# Patient Record
Sex: FEMALE | Race: BLACK OR AFRICAN AMERICAN | NOT HISPANIC OR LATINO | URBAN - METROPOLITAN AREA
[De-identification: names, ages, dates, MRNs, and addresses within clinical notes are randomized per-mention and may not be internally consistent; named-entity substitution may affect disease eponyms.]

---

## 2018-09-26 ENCOUNTER — INPATIENT (INPATIENT)
Facility: HOSPITAL | Age: 58
LOS: 2 days | Discharge: HOME | DRG: 103 | End: 2018-09-29
Attending: INTERNAL MEDICINE | Admitting: INTERNAL MEDICINE
Payer: COMMERCIAL

## 2018-09-26 VITALS
WEIGHT: 184.09 LBS | TEMPERATURE: 99 F | OXYGEN SATURATION: 95 % | HEART RATE: 77 BPM | HEIGHT: 64 IN | RESPIRATION RATE: 16 BRPM | DIASTOLIC BLOOD PRESSURE: 62 MMHG | SYSTOLIC BLOOD PRESSURE: 96 MMHG

## 2018-09-26 DIAGNOSIS — N17.9 ACUTE KIDNEY FAILURE, UNSPECIFIED: ICD-10-CM

## 2018-09-26 DIAGNOSIS — Z29.9 ENCOUNTER FOR PROPHYLACTIC MEASURES, UNSPECIFIED: ICD-10-CM

## 2018-09-26 DIAGNOSIS — Z98.890 OTHER SPECIFIED POSTPROCEDURAL STATES: Chronic | ICD-10-CM

## 2018-09-26 DIAGNOSIS — R55 SYNCOPE AND COLLAPSE: ICD-10-CM

## 2018-09-26 DIAGNOSIS — E03.9 HYPOTHYROIDISM, UNSPECIFIED: ICD-10-CM

## 2018-09-26 DIAGNOSIS — G43.909 MIGRAINE, UNSPECIFIED, NOT INTRACTABLE, WITHOUT STATUS MIGRAINOSUS: ICD-10-CM

## 2018-09-26 DIAGNOSIS — Z90.49 ACQUIRED ABSENCE OF OTHER SPECIFIED PARTS OF DIGESTIVE TRACT: Chronic | ICD-10-CM

## 2018-09-26 DIAGNOSIS — J84.9 INTERSTITIAL PULMONARY DISEASE, UNSPECIFIED: ICD-10-CM

## 2018-09-26 DIAGNOSIS — I10 ESSENTIAL (PRIMARY) HYPERTENSION: ICD-10-CM

## 2018-09-26 DIAGNOSIS — E87.8 OTHER DISORDERS OF ELECTROLYTE AND FLUID BALANCE, NOT ELSEWHERE CLASSIFIED: ICD-10-CM

## 2018-09-26 LAB
ALBUMIN SERPL ELPH-MCNC: 3.6 G/DL — SIGNIFICANT CHANGE UP (ref 3.5–5)
ALP SERPL-CCNC: 81 U/L — SIGNIFICANT CHANGE UP (ref 40–120)
ALT FLD-CCNC: 24 U/L DA — SIGNIFICANT CHANGE UP (ref 10–60)
ANION GAP SERPL CALC-SCNC: 8 MMOL/L — SIGNIFICANT CHANGE UP (ref 5–17)
ANION GAP SERPL CALC-SCNC: 9 MMOL/L — SIGNIFICANT CHANGE UP (ref 5–17)
APPEARANCE UR: CLEAR — SIGNIFICANT CHANGE UP
APTT BLD: 28.1 SEC — SIGNIFICANT CHANGE UP (ref 27.5–37.4)
AST SERPL-CCNC: 22 U/L — SIGNIFICANT CHANGE UP (ref 10–40)
BASOPHILS # BLD AUTO: 0 K/UL — SIGNIFICANT CHANGE UP (ref 0–0.2)
BASOPHILS NFR BLD AUTO: 0.5 % — SIGNIFICANT CHANGE UP (ref 0–2)
BILIRUB SERPL-MCNC: 0.5 MG/DL — SIGNIFICANT CHANGE UP (ref 0.2–1.2)
BILIRUB UR-MCNC: NEGATIVE — SIGNIFICANT CHANGE UP
BUN SERPL-MCNC: 18 MG/DL — SIGNIFICANT CHANGE UP (ref 7–18)
BUN SERPL-MCNC: 21 MG/DL — HIGH (ref 7–18)
CALCIUM SERPL-MCNC: 10.5 MG/DL — SIGNIFICANT CHANGE UP (ref 8.4–10.5)
CALCIUM SERPL-MCNC: 9.7 MG/DL — SIGNIFICANT CHANGE UP (ref 8.4–10.5)
CHLORIDE SERPL-SCNC: 102 MMOL/L — SIGNIFICANT CHANGE UP (ref 96–108)
CHLORIDE SERPL-SCNC: 108 MMOL/L — SIGNIFICANT CHANGE UP (ref 96–108)
CO2 SERPL-SCNC: 23 MMOL/L — SIGNIFICANT CHANGE UP (ref 22–31)
CO2 SERPL-SCNC: 27 MMOL/L — SIGNIFICANT CHANGE UP (ref 22–31)
COLOR SPEC: YELLOW — SIGNIFICANT CHANGE UP
CREAT SERPL-MCNC: 1.05 MG/DL — SIGNIFICANT CHANGE UP (ref 0.5–1.3)
CREAT SERPL-MCNC: 1.43 MG/DL — HIGH (ref 0.5–1.3)
D DIMER BLD IA.RAPID-MCNC: 345 NG/ML DDU — HIGH
DIFF PNL FLD: NEGATIVE — SIGNIFICANT CHANGE UP
EOSINOPHIL # BLD AUTO: 0.1 K/UL — SIGNIFICANT CHANGE UP (ref 0–0.5)
EOSINOPHIL NFR BLD AUTO: 1.1 % — SIGNIFICANT CHANGE UP (ref 0–6)
GLUCOSE SERPL-MCNC: 123 MG/DL — HIGH (ref 70–99)
GLUCOSE SERPL-MCNC: 99 MG/DL — SIGNIFICANT CHANGE UP (ref 70–99)
GLUCOSE UR QL: NEGATIVE — SIGNIFICANT CHANGE UP
HCT VFR BLD CALC: 44.9 % — SIGNIFICANT CHANGE UP (ref 34.5–45)
HGB BLD-MCNC: 14.1 G/DL — SIGNIFICANT CHANGE UP (ref 11.5–15.5)
INR BLD: 1.04 RATIO — SIGNIFICANT CHANGE UP (ref 0.88–1.16)
KETONES UR-MCNC: NEGATIVE — SIGNIFICANT CHANGE UP
LEUKOCYTE ESTERASE UR-ACNC: NEGATIVE — SIGNIFICANT CHANGE UP
LYMPHOCYTES # BLD AUTO: 0.8 K/UL — LOW (ref 1–3.3)
LYMPHOCYTES # BLD AUTO: 17 % — SIGNIFICANT CHANGE UP (ref 13–44)
MAGNESIUM SERPL-MCNC: 2.3 MG/DL — SIGNIFICANT CHANGE UP (ref 1.6–2.6)
MAGNESIUM SERPL-MCNC: 2.4 MG/DL — SIGNIFICANT CHANGE UP (ref 1.6–2.6)
MCHC RBC-ENTMCNC: 26.8 PG — LOW (ref 27–34)
MCHC RBC-ENTMCNC: 31.4 GM/DL — LOW (ref 32–36)
MCV RBC AUTO: 85.5 FL — SIGNIFICANT CHANGE UP (ref 80–100)
MONOCYTES # BLD AUTO: 0.3 K/UL — SIGNIFICANT CHANGE UP (ref 0–0.9)
MONOCYTES NFR BLD AUTO: 5.4 % — SIGNIFICANT CHANGE UP (ref 2–14)
NEUTROPHILS # BLD AUTO: 3.7 K/UL — SIGNIFICANT CHANGE UP (ref 1.8–7.4)
NEUTROPHILS NFR BLD AUTO: 76.1 % — SIGNIFICANT CHANGE UP (ref 43–77)
NITRITE UR-MCNC: NEGATIVE — SIGNIFICANT CHANGE UP
PH UR: 7 — SIGNIFICANT CHANGE UP (ref 5–8)
PHOSPHATE SERPL-MCNC: 2 MG/DL — LOW (ref 2.5–4.5)
PHOSPHATE SERPL-MCNC: 2.2 MG/DL — LOW (ref 2.5–4.5)
PLATELET # BLD AUTO: 180 K/UL — SIGNIFICANT CHANGE UP (ref 150–400)
POTASSIUM SERPL-MCNC: 2.8 MMOL/L — CRITICAL LOW (ref 3.5–5.3)
POTASSIUM SERPL-MCNC: 4 MMOL/L — SIGNIFICANT CHANGE UP (ref 3.5–5.3)
POTASSIUM SERPL-SCNC: 2.8 MMOL/L — CRITICAL LOW (ref 3.5–5.3)
POTASSIUM SERPL-SCNC: 4 MMOL/L — SIGNIFICANT CHANGE UP (ref 3.5–5.3)
PROT SERPL-MCNC: 6.7 G/DL — SIGNIFICANT CHANGE UP (ref 6–8.3)
PROT UR-MCNC: NEGATIVE — SIGNIFICANT CHANGE UP
PROTHROM AB SERPL-ACNC: 11.3 SEC — SIGNIFICANT CHANGE UP (ref 9.8–12.7)
RBC # BLD: 5.25 M/UL — HIGH (ref 3.8–5.2)
RBC # FLD: 16.1 % — HIGH (ref 10.3–14.5)
SODIUM SERPL-SCNC: 138 MMOL/L — SIGNIFICANT CHANGE UP (ref 135–145)
SODIUM SERPL-SCNC: 139 MMOL/L — SIGNIFICANT CHANGE UP (ref 135–145)
SP GR SPEC: 1 — LOW (ref 1.01–1.02)
T4 AB SER-ACNC: 5.3 UG/DL — SIGNIFICANT CHANGE UP (ref 4.6–12)
T4 FREE SERPL-MCNC: 0.7 NG/DL — LOW (ref 0.9–1.8)
TROPONIN I SERPL-MCNC: <0.015 NG/ML — SIGNIFICANT CHANGE UP (ref 0–0.04)
TSH SERPL-MCNC: 89.5 UU/ML — HIGH (ref 0.34–4.82)
UROBILINOGEN FLD QL: 4
WBC # BLD: 4.8 K/UL — SIGNIFICANT CHANGE UP (ref 3.8–10.5)
WBC # FLD AUTO: 4.8 K/UL — SIGNIFICANT CHANGE UP (ref 3.8–10.5)

## 2018-09-26 PROCEDURE — 99223 1ST HOSP IP/OBS HIGH 75: CPT | Mod: AI,GC

## 2018-09-26 PROCEDURE — 70450 CT HEAD/BRAIN W/O DYE: CPT | Mod: 26

## 2018-09-26 PROCEDURE — 71046 X-RAY EXAM CHEST 2 VIEWS: CPT | Mod: 26

## 2018-09-26 PROCEDURE — 71275 CT ANGIOGRAPHY CHEST: CPT | Mod: 26

## 2018-09-26 PROCEDURE — 99285 EMERGENCY DEPT VISIT HI MDM: CPT

## 2018-09-26 RX ORDER — POTASSIUM PHOSPHATE, MONOBASIC POTASSIUM PHOSPHATE, DIBASIC 236; 224 MG/ML; MG/ML
15 INJECTION, SOLUTION INTRAVENOUS ONCE
Qty: 0 | Refills: 0 | Status: COMPLETED | OUTPATIENT
Start: 2018-09-26 | End: 2018-09-26

## 2018-09-26 RX ORDER — ATOVAQUONE 750 MG/5ML
750 SUSPENSION ORAL DAILY
Qty: 0 | Refills: 0 | Status: DISCONTINUED | OUTPATIENT
Start: 2018-09-26 | End: 2018-09-29

## 2018-09-26 RX ORDER — SUMATRIPTAN SUCCINATE 4 MG/.5ML
50 INJECTION, SOLUTION SUBCUTANEOUS DAILY
Qty: 0 | Refills: 0 | Status: DISCONTINUED | OUTPATIENT
Start: 2018-09-26 | End: 2018-09-27

## 2018-09-26 RX ORDER — ACETAMINOPHEN 500 MG
650 TABLET ORAL ONCE
Qty: 0 | Refills: 0 | Status: COMPLETED | OUTPATIENT
Start: 2018-09-26 | End: 2018-09-26

## 2018-09-26 RX ORDER — SODIUM CHLORIDE 9 MG/ML
1000 INJECTION INTRAMUSCULAR; INTRAVENOUS; SUBCUTANEOUS ONCE
Qty: 0 | Refills: 0 | Status: COMPLETED | OUTPATIENT
Start: 2018-09-26 | End: 2018-09-26

## 2018-09-26 RX ORDER — POTASSIUM CHLORIDE 20 MEQ
40 PACKET (EA) ORAL ONCE
Qty: 0 | Refills: 0 | Status: COMPLETED | OUTPATIENT
Start: 2018-09-26 | End: 2018-09-26

## 2018-09-26 RX ORDER — ASPIRIN/CALCIUM CARB/MAGNESIUM 324 MG
162 TABLET ORAL ONCE
Qty: 0 | Refills: 0 | Status: COMPLETED | OUTPATIENT
Start: 2018-09-26 | End: 2018-09-26

## 2018-09-26 RX ORDER — POTASSIUM CHLORIDE 20 MEQ
40 PACKET (EA) ORAL EVERY 4 HOURS
Qty: 0 | Refills: 0 | Status: DISCONTINUED | OUTPATIENT
Start: 2018-09-26 | End: 2018-09-26

## 2018-09-26 RX ORDER — POLYETHYLENE GLYCOL 3350 17 G/17G
17 POWDER, FOR SOLUTION ORAL DAILY
Qty: 0 | Refills: 0 | Status: DISCONTINUED | OUTPATIENT
Start: 2018-09-26 | End: 2018-09-29

## 2018-09-26 RX ORDER — LEVOTHYROXINE SODIUM 125 MCG
88 TABLET ORAL DAILY
Qty: 0 | Refills: 0 | Status: DISCONTINUED | OUTPATIENT
Start: 2018-09-26 | End: 2018-09-27

## 2018-09-26 RX ORDER — HEPARIN SODIUM 5000 [USP'U]/ML
5000 INJECTION INTRAVENOUS; SUBCUTANEOUS EVERY 8 HOURS
Qty: 0 | Refills: 0 | Status: DISCONTINUED | OUTPATIENT
Start: 2018-09-26 | End: 2018-09-29

## 2018-09-26 RX ORDER — POTASSIUM CHLORIDE 20 MEQ
10 PACKET (EA) ORAL
Qty: 0 | Refills: 0 | Status: COMPLETED | OUTPATIENT
Start: 2018-09-26 | End: 2018-09-26

## 2018-09-26 RX ORDER — LABETALOL HCL 100 MG
100 TABLET ORAL
Qty: 0 | Refills: 0 | Status: DISCONTINUED | OUTPATIENT
Start: 2018-09-26 | End: 2018-09-29

## 2018-09-26 RX ADMIN — SODIUM CHLORIDE 2000 MILLILITER(S): 9 INJECTION INTRAMUSCULAR; INTRAVENOUS; SUBCUTANEOUS at 10:56

## 2018-09-26 RX ADMIN — Medication 650 MILLIGRAM(S): at 22:38

## 2018-09-26 RX ADMIN — Medication 100 MILLIEQUIVALENT(S): at 16:41

## 2018-09-26 RX ADMIN — Medication 162 MILLIGRAM(S): at 10:56

## 2018-09-26 RX ADMIN — Medication 40 MILLIEQUIVALENT(S): at 13:21

## 2018-09-26 RX ADMIN — Medication 100 MILLIEQUIVALENT(S): at 15:29

## 2018-09-26 RX ADMIN — Medication 650 MILLIGRAM(S): at 22:08

## 2018-09-26 RX ADMIN — Medication 100 MILLIEQUIVALENT(S): at 13:21

## 2018-09-26 RX ADMIN — Medication 20 MILLIGRAM(S): at 22:33

## 2018-09-26 NOTE — ED PROVIDER NOTE - PMH
CKD (chronic kidney disease)    HTN (hypertension)    Hypothyroidism    Interstitial lung disease    Migraine    Moderate persistent asthma without complication

## 2018-09-26 NOTE — H&P ADULT - PROBLEM SELECTOR PLAN 1
Severe headache & Nausea leading to Syncope  Likely due to Chronic history of Migraines vs Hypotension  CT head: Normal  No need of MRI as there is no focal deficit   Recent Echo and Stress test normal in Feb 2018 Severe headache & Nausea leading to Syncope  Likely due to Chronic history of Migraines vs Hypotension  CT head: Normal  No need of MRI as there is no focal deficit   EKG: Normal sinus, Mild LBBB  Normal cardiac enzymes  Recent Echo and Stress test normal in Feb 2018 Severe headache & Nausea leading to Syncope  Likely due to Chronic history of Migraines vs Hypotension  CT head: Normal  Orthostatics Negative as patient got Bolus 1 Lit  No need of MRI as there is no focal deficit   EKG: Normal sinus, Mild LBBB  Normal cardiac enzymes  Recent Echo and Stress test normal in Feb 2018

## 2018-09-26 NOTE — H&P ADULT - HISTORY OF PRESENT ILLNESS
57 Female from home having PMH of HTN, Asthma, CKD, Migraines, Hypothyroidism, Autoimmune interstitial lung disease who comes to the hospital for syncope and shortness of breath. Pt woke up yesterday and went to kitchen when she started having severe headache similar to migraines associated with Nausea. She sat down on the chair and closed her eyes. When she woke up she was on the floor and wasn't aware for how long she stayed there. Pt denies loss of urine or bowel, shaky movements, focal weakness or slurry speech. She refused to come to hospital yesterday, today she was having again similar severe headache and associated shortness of breath which made her come to hospital.   Of Note, Pt recently had a lung biopsy in July 2018 at [facility] which came back as autoimmune interstitial lung disease and was started on steroids 3 weeks ago. She was also found to be hypothyroid recently. Pt had a Echo and stress test in Feb 2018 which was normal. Pt has been asked to see a Nephrologist for worsening renal function.     FH: Father had Gastric cancer, Mother had Ovarian cancer    ED Course: Pt was hypotensive to 96/62. She was given a bolus. Labs showed hypokalemia of 2.8 and TSH of 89. Elevated renal functions. CT angio showed left lung wedge resection without PE. 57 Female from home having PMH of HTN, Asthma, CKD, Migraines, Hypothyroidism, Autoimmune interstitial lung disease who comes to the hospital for syncope and shortness of breath. Pt woke up yesterday and went to kitchen when she started having severe headache similar to migraines associated with Nausea. She sat down on the chair and closed her eyes. When she woke up she was on the floor and wasn't aware for how long she stayed there. Pt denies loss of urine or bowel, shaky movements, focal weakness or slurry speech. She refused to come to hospital yesterday, today she was having again similar severe headache and associated shortness of breath which made her come to hospital.   Of Note, Pt recently had a lung biopsy in July 2018 at [facility] which came back as autoimmune interstitial lung disease and was started on steroids 3 weeks ago. She was also found to be hypothyroid recently. Pt had a Echo and stress test in Feb 2018 which was normal. Pt has been asked to see a Nephrologist for worsening renal function. Takes Lasix 40 off and on based on her LE edema at home.     FH: Father had Gastric cancer, Mother had Ovarian cancer    ED Course: Pt was hypotensive to 96/62. She was given a bolus. Labs showed hypokalemia of 2.8 and TSH of 89. Elevated renal functions. CT angio showed left lung wedge resection without PE. 57 Female from home having PMH of HTN, Asthma, CKD, Migraines, Hypothyroidism, Autoimmune interstitial lung disease who comes to the hospital for syncope and shortness of breath. Pt woke up yesterday and went to kitchen when she started having severe headache similar to migraines associated with Nausea. She sat down on the chair and closed her eyes. When she woke up she was on the floor and wasn't aware for how long she stayed there. Pt denies loss of urine or bowel, shaky movements, focal weakness or slurry speech. She refused to come to hospital yesterday, today she was having again similar severe headache and associated shortness of breath which made her come to hospital.   Of Note, Pt recently had a lung biopsy in July 2018 at [facility] which came back as autoimmune interstitial lung disease and was started on steroids 3 weeks ago. She was also found to be hypothyroid recently. Pt had a Echo and stress test in Feb 2018 which was normal. Pt has been asked to see a Nephrologist for worsening renal function. Takes Lasix 40 off and on based on her LE edema at home.     FH: Father had Gastric cancer, Mother had Ovarian cancer    ED Course: Pt was hypotensive to 96/62. She was given a bolus. Labs showed hypokalemia of 2.8 and TSH of 89. Elevated renal functions. CT angio showed left lung wedge resection without PE. EKG showed NSR with mild LBBB. 57 Female from home having PMH of HTN, Asthma, CKD, Migraines, Hypothyroidism, Autoimmune interstitial lung disease who comes to the hospital for syncope and shortness of breath. Pt woke up yesterday and went to kitchen when she started having severe headache similar to migraines associated with Nausea. She sat down on the chair and closed her eyes. When she woke up she was on the floor and wasn't aware for how long she stayed there. Pt denies loss of urine or bowel, shaky movements, focal weakness or slurry speech. She refused to come to hospital yesterday, today she was having again similar severe headache and associated shortness of breath which made her come to hospital.   Of Note, Pt recently had a lung biopsy in July 2018 at [facility] which came back as autoimmune interstitial lung disease and was started on steroids 3 weeks ago. She was also found to be hypothyroid recently. Pt had a Echo and stress test in Feb 2018 which was normal. Pt has been asked to see a Nephrologist for worsening renal function. Takes Lasix 40 off and on based on her LE edema at home.     FH: Father had Gastric cancer, Mother had Ovarian cancer    ED Course: Pt was hypotensive to 96/62. She was given a bolus. Labs showed hypokalemia of 2.8 and TSH of 89. Elevated renal functions. CT angio showed left lung wedge resection without PE. EKG showed NSR with mild LBBB. Orthostatics Negative.

## 2018-09-26 NOTE — H&P ADULT - PROBLEM SELECTOR PLAN 8
Improve VTE score: 1 (Heparin sq)  [ ] Previous VTE                                               3  [ ] Thrombophilia                                             2  [ ] Lower limb paralysis                                   2    [ ] Current Cancer                                           2   [x] Immobilization > 24 hrs                              1  [ ] ICU/CCU stay > 24 hours                            1  [ ] Age > 60                                                       1 C/w home meds  Fioricet, Imitrex, Trokendi XR as needed

## 2018-09-26 NOTE — H&P ADULT - PROBLEM SELECTOR PLAN 5
TSH 89  Has chronic constipation  C/w Synthroid 88 daily  F/u Free T3 Pt takes Telmisartan-hctz, Labetalol  Pt was hypotensive on admission  Restart meds as needed

## 2018-09-26 NOTE — H&P ADULT - ASSESSMENT
57 Female from home having PMH of HTN, Asthma, CKD, Migraines, Hypothyroidism, Autoimmune interstitial lung disease who comes to the hospital for syncope and shortness of breath. Pt woke up yesterday and went to kitchen when she started having severe headache similar to migraines associated with Nausea. She sat down on the chair and closed her eyes. When she woke up she was on the floor and wasn't aware for how long she stayed there. Pt denies loss of urine or bowel, shaky movements, focal weakness or slurry speech. She refused to come to hospital yesterday, today she was having again similar severe headache and associated shortness of breath which made her come to hospital.   Of Note, Pt recently had a lung biopsy in July 2018 at [facility] which came back as autoimmune interstitial lung disease and was started on steroids 3 weeks ago. She was also found to be hypothyroid recently. Pt had a Echo and stress test in Feb 2018 which was normal. Pt has been asked to see a Nephrologist for worsening renal function.     FH: Father had Gastric cancer, Mother had Ovarian cancer    ED Course: Pt was hypotensive to 96/62. She was given a bolus. Labs showed hypokalemia of 2.8 and TSH of 89. Elevated renal functions. CT angio showed left lung wedge resection without PE. 57 Female from home having PMH of HTN, Asthma, CKD, Migraines, Hypothyroidism, Autoimmune interstitial lung disease who comes to the hospital for syncope and shortness of breath. Pt had a Echo and stress test in Feb 2018 which was normal. 57 Female from home having PMH of HTN, Asthma, CKD, Migraines, Hypothyroidism, Autoimmune interstitial lung disease who comes to the hospital for syncope and shortness of breath. Pt had a Echo and stress test in Feb 2018 which was normal.    Orthostatic BP: Laying 102/62 (68) --> Standing 124/79 (78)

## 2018-09-26 NOTE — H&P ADULT - NSHPOUTPATIENTPROVIDERS_GEN_ALL_CORE
Dr Em Conte (Pul) [phone]  Dr Griffiths (Cardio) [phone]  Dr Jeane Feliz (Endo) [phone]   Dr Em Conte (Pul) [phone]  Dr Griffiths (Cardio) [phone]  Dr Jeane Feliz (Endo) [phone]  Dr Sam (Pul) [phone]

## 2018-09-26 NOTE — H&P ADULT - PROBLEM SELECTOR PLAN 9
Improve VTE score: 1 (Heparin sq)  [ ] Previous VTE                                               3  [ ] Thrombophilia                                             2  [ ] Lower limb paralysis                                   2    [ ] Current Cancer                                           2   [x] Immobilization > 24 hrs                              1  [ ] ICU/CCU stay > 24 hours                            1  [ ] Age > 60                                                       1

## 2018-09-26 NOTE — ED PROVIDER NOTE - OBJECTIVE STATEMENT
57F from home PMH HTN, Asthma, CKD, Migraines, Hypothyroidism, Autoimmune interstitial lung disease who comes to the hospital for syncope and palpitations.  Patient has been having a spat of health problems recently. Had a lung biopsy for an abnormality seen on cxr at [facility] which came back as autoimmune interstitial lung disease - still being worked up and was started on steroids. However, as a consequence of being on steroids, patient's thyroid function became abnormal despite synthroid medication not being changed. She was found to be hyperthyroid and was recommended to cut her synthroid in half but she's been forgetting occasionally and still taking full dose.   Patient also has additional social stressors because her daughter had her first child recently and she's been trying to help out.   Yesterday she took a full dose of synthroid by mistake and later in the day, she felt she had to get up from acid reflux. When she got up and out of bed, she felt dizzy and immediately sat back down. The next thing she knew, she had passed out and woke up on the floor. However she did not want to go to the hospital and stayed in bed the rest of the day. Today she's been having other symptoms - she complains of a feeling of palpitations and has some discomfort on deep breathing.

## 2018-09-26 NOTE — H&P ADULT - RS GEN PE MLT RESP DETAILS PC
breath sounds equal/good air movement/airway patent/no chest wall tenderness/normal/clear to auscultation bilaterally/respirations non-labored

## 2018-09-26 NOTE — H&P ADULT - PROBLEM SELECTOR PLAN 6
Unknown baseline renal functions  Cr 1.43  Holding Losartan, Hctz and Lasix Unknown baseline renal functions  Cr 1.43  Holding Telmesartan, Hctz and Lasix TSH 89  Has chronic constipation  C/w Synthroid 88 daily  F/u Free T3

## 2018-09-26 NOTE — H&P ADULT - ATTENDING COMMENTS
Patient was examined in the ED with Dr. Butts.   Syncope, likely secondary to hypovolemia.  Hypokalemia  Interstitial lung disease by history  Hypothyroidism.     Plan: IV fluids.  Replace potassium.  Records from Pulmonologist at [facility].           f/u TFT's           Rheumatology panel.

## 2018-09-26 NOTE — H&P ADULT - PROBLEM SELECTOR PLAN 3
Recently diagnosed in July 2018  Not wheezing  C/w Prednisone 20 Recently diagnosed in July 2018  Not wheezing  C/w Prednisone 20 bid  Pt is taking Atovaquone as ppx for PCP as she is on steroids Recently diagnosed in July 2018  Not wheezing  C/w Prednisone 20 bid  Pt is taking Atovaquone as ppx for PCP as she is on steroids  Dr Sam (Pul) [phone]: Get biopsy results in AM

## 2018-09-26 NOTE — ED ADULT NURSE NOTE - OBJECTIVE STATEMENT
Patient presents to Ed Patient presents to ED with dizziness , palpations and chest pain. Patient with multiple issues lung biospy took thyroid medication by mistake , daughter just had baby. Patient states at present she doesn't have pain but feels very weak.

## 2018-09-26 NOTE — ED ADULT NURSE NOTE - NSIMPLEMENTINTERV_GEN_ALL_ED
Implemented All Universal Safety Interventions:  [place] to call system. Call bell, personal items and telephone within reach. Instruct patient to call for assistance. Room bathroom lighting operational. Non-slip footwear when patient is off stretcher. Physically safe environment: no spills, clutter or unnecessary equipment. Stretcher in lowest position, wheels locked, appropriate side rails in place.

## 2018-09-26 NOTE — ED PROVIDER NOTE - FAMILY HISTORY
Father  Still living? Unknown  Family history of hypertension, Age at diagnosis: Age Unknown  Family history of coronary artery disease, Age at diagnosis: Age Unknown  Family history of cerebrovascular accident (CVA), Age at diagnosis: Age Unknown  Family history of stomach cancer, Age at diagnosis: Age Unknown     Mother  Still living? Unknown  Family history of hypertension, Age at diagnosis: Age Unknown  Family history of coronary artery disease, Age at diagnosis: Age Unknown  Family history of ovarian cancer, Age at diagnosis: Age Unknown

## 2018-09-26 NOTE — ED PROVIDER NOTE - CHPI ED SYMPTOMS NEG
no back pain/no chills/no nausea/no shortness of breath/no vomiting/no cough/no dizziness/no diaphoresis/no fever

## 2018-09-26 NOTE — H&P ADULT - FAMILY HISTORY
Mother  Still living? Unknown  Family history of hypertension, Age at diagnosis: Age Unknown  Family history of cerebrovascular accident (CVA), Age at diagnosis: Age Unknown  Family history of ovarian cancer, Age at diagnosis: Age Unknown     Father  Still living? Unknown  Family history of coronary artery disease, Age at diagnosis: Age Unknown  Family history of stomach cancer, Age at diagnosis: Age Unknown

## 2018-09-26 NOTE — H&P ADULT - PROBLEM SELECTOR PLAN 4
Pt takes Telmisartan-hctz, Labetalol  Pt was hypotensive on admission  Restart meds as needed Ocular dryness and diffuse joint pains  F/u Autoimmune panel  Could be underlying cause of ILD Ocular dryness and diffuse joint pains  F/u Autoimmune panel  Could be underlying cause of ILD  Call Rheumatologist in AM

## 2018-09-26 NOTE — H&P ADULT - PROBLEM SELECTOR PLAN 2
Severe Hypokalemia and Hypophosphatemia  Monitor electrolytes and replace aggressively Likely due to use of Diuretics at home  Severe Hypokalemia and Hypophosphatemia  Monitor electrolytes and replace aggressively\  F/u Urine electrolytes

## 2018-09-26 NOTE — ED PROVIDER NOTE - CONSTITUTIONAL, MLM
normal... faint appearing and weak, well nourished, awake, alert, oriented to person, place, time/situation

## 2018-09-26 NOTE — H&P ADULT - PROBLEM SELECTOR PLAN 7
C/w home meds  Fioricet, Imitrex, Trokendi XR C/w home meds  Fioricet, Imitrex, Trokendi XR as needed Unknown baseline renal functions  Cr 1.43  Holding Telmesartan, Hctz and Lasix

## 2018-09-26 NOTE — ED PROVIDER NOTE - ATTENDING CONTRIBUTION TO CARE
Dr. Sneed: I have personally performed a face to face bedside history and physical examination of this patient. I have discussed the history, examination, review of systems, assessment and plan of management with the resident. I have reviewed the electronic medical record and amended it to reflect my history, review of systems, physical exam, assessment and plan.    Attending Exam - Dr. Sneed: GEN: well appearing, NAD  HEENT: +PERRL, EOMI  RESP: CTAB, no signs of respiratory distress CV: s1s2 RRR ABD: soft/non tender/non distended  MSK: no deformities / swelling, normal range of motion, spine grossly normal NEURO: alert, non focal exam SKIN: normal color / temperature / condition.

## 2018-09-26 NOTE — ED ADULT NURSE NOTE - ED STAT RN HANDOFF DETAILS
received  pt.in bed at 1910  pt.is  awake and  ressponsive. c/o  head ache. tylenol  650 mg   given  as ordered.  on tele box G with  NSR,.transfer to rm 501 C report given to jina carbajal. pt.not  in distress

## 2018-09-26 NOTE — ED ADULT NURSE NOTE - CHPI ED NUR SYMPTOMS NEG
no shortness of breath/no nausea/no fever/no diaphoresis/no chills/no syncope/no vomiting/no back pain/no congestion

## 2018-09-27 LAB
ALBUMIN SERPL ELPH-MCNC: 3.5 G/DL — SIGNIFICANT CHANGE UP (ref 3.5–5)
ALP SERPL-CCNC: 83 U/L — SIGNIFICANT CHANGE UP (ref 40–120)
ALT FLD-CCNC: 22 U/L DA — SIGNIFICANT CHANGE UP (ref 10–60)
ANION GAP SERPL CALC-SCNC: 7 MMOL/L — SIGNIFICANT CHANGE UP (ref 5–17)
AST SERPL-CCNC: 15 U/L — SIGNIFICANT CHANGE UP (ref 10–40)
BASOPHILS # BLD AUTO: 0 K/UL — SIGNIFICANT CHANGE UP (ref 0–0.2)
BASOPHILS NFR BLD AUTO: 0.4 % — SIGNIFICANT CHANGE UP (ref 0–2)
BILIRUB SERPL-MCNC: 0.4 MG/DL — SIGNIFICANT CHANGE UP (ref 0.2–1.2)
BUN SERPL-MCNC: 16 MG/DL — SIGNIFICANT CHANGE UP (ref 7–18)
CALCIUM SERPL-MCNC: 10.1 MG/DL — SIGNIFICANT CHANGE UP (ref 8.4–10.5)
CHLORIDE SERPL-SCNC: 107 MMOL/L — SIGNIFICANT CHANGE UP (ref 96–108)
CK MB BLD-MCNC: 1.2 % — SIGNIFICANT CHANGE UP (ref 0–3.5)
CK MB CFR SERPL CALC: 1.1 NG/ML — SIGNIFICANT CHANGE UP (ref 0–3.6)
CK SERPL-CCNC: 94 U/L — SIGNIFICANT CHANGE UP (ref 21–215)
CO2 SERPL-SCNC: 24 MMOL/L — SIGNIFICANT CHANGE UP (ref 22–31)
CREAT SERPL-MCNC: 1 MG/DL — SIGNIFICANT CHANGE UP (ref 0.5–1.3)
CULTURE RESULTS: SIGNIFICANT CHANGE UP
EOSINOPHIL # BLD AUTO: 0 K/UL — SIGNIFICANT CHANGE UP (ref 0–0.5)
EOSINOPHIL NFR BLD AUTO: 0.3 % — SIGNIFICANT CHANGE UP (ref 0–6)
ERYTHROCYTE [SEDIMENTATION RATE] IN BLOOD: 2 MM/HR — SIGNIFICANT CHANGE UP (ref 0–20)
FERRITIN SERPL-MCNC: 180 NG/ML — HIGH (ref 15–150)
FOLATE SERPL-MCNC: 5.4 NG/ML — SIGNIFICANT CHANGE UP
GLUCOSE SERPL-MCNC: 104 MG/DL — HIGH (ref 70–99)
HBA1C BLD-MCNC: 4.4 % — SIGNIFICANT CHANGE UP (ref 4–5.6)
HCT VFR BLD CALC: 40.3 % — SIGNIFICANT CHANGE UP (ref 34.5–45)
HGB BLD-MCNC: 12.9 G/DL — SIGNIFICANT CHANGE UP (ref 11.5–15.5)
LYMPHOCYTES # BLD AUTO: 0.6 K/UL — LOW (ref 1–3.3)
LYMPHOCYTES # BLD AUTO: 13 % — SIGNIFICANT CHANGE UP (ref 13–44)
MAGNESIUM SERPL-MCNC: 2.3 MG/DL — SIGNIFICANT CHANGE UP (ref 1.6–2.6)
MCHC RBC-ENTMCNC: 27.4 PG — SIGNIFICANT CHANGE UP (ref 27–34)
MCHC RBC-ENTMCNC: 31.9 GM/DL — LOW (ref 32–36)
MCV RBC AUTO: 85.7 FL — SIGNIFICANT CHANGE UP (ref 80–100)
MONOCYTES # BLD AUTO: 0.2 K/UL — SIGNIFICANT CHANGE UP (ref 0–0.9)
MONOCYTES NFR BLD AUTO: 4.2 % — SIGNIFICANT CHANGE UP (ref 2–14)
NEUTROPHILS # BLD AUTO: 3.8 K/UL — SIGNIFICANT CHANGE UP (ref 1.8–7.4)
NEUTROPHILS NFR BLD AUTO: 82 % — HIGH (ref 43–77)
PHOSPHATE SERPL-MCNC: 2 MG/DL — LOW (ref 2.5–4.5)
PLATELET # BLD AUTO: 176 K/UL — SIGNIFICANT CHANGE UP (ref 150–400)
POTASSIUM SERPL-MCNC: 3.6 MMOL/L — SIGNIFICANT CHANGE UP (ref 3.5–5.3)
POTASSIUM SERPL-SCNC: 3.6 MMOL/L — SIGNIFICANT CHANGE UP (ref 3.5–5.3)
PROT SERPL-MCNC: 6.4 G/DL — SIGNIFICANT CHANGE UP (ref 6–8.3)
RBC # BLD: 4.7 M/UL — SIGNIFICANT CHANGE UP (ref 3.8–5.2)
RBC # FLD: 16.1 % — HIGH (ref 10.3–14.5)
RHEUMATOID FACT SERPL-ACNC: <10 IU/ML — SIGNIFICANT CHANGE UP (ref 0–13)
SODIUM SERPL-SCNC: 138 MMOL/L — SIGNIFICANT CHANGE UP (ref 135–145)
SPECIMEN SOURCE: SIGNIFICANT CHANGE UP
T3FREE SERPL-MCNC: 0.98 PG/ML — LOW (ref 1.8–4.6)
T4 FREE SERPL-MCNC: 0.7 NG/DL — LOW (ref 0.9–1.8)
TROPONIN I SERPL-MCNC: <0.015 NG/ML — SIGNIFICANT CHANGE UP (ref 0–0.04)
URATE SERPL-MCNC: 3.8 MG/DL — SIGNIFICANT CHANGE UP (ref 2.5–7)
VIT B12 SERPL-MCNC: 393 PG/ML — SIGNIFICANT CHANGE UP (ref 232–1245)
WBC # BLD: 4.6 K/UL — SIGNIFICANT CHANGE UP (ref 3.8–10.5)
WBC # FLD AUTO: 4.6 K/UL — SIGNIFICANT CHANGE UP (ref 3.8–10.5)

## 2018-09-27 PROCEDURE — 93306 TTE W/DOPPLER COMPLETE: CPT | Mod: 26

## 2018-09-27 PROCEDURE — 99233 SBSQ HOSP IP/OBS HIGH 50: CPT | Mod: GC

## 2018-09-27 RX ORDER — SODIUM,POTASSIUM PHOSPHATES 278-250MG
1 POWDER IN PACKET (EA) ORAL
Qty: 0 | Refills: 0 | Status: COMPLETED | OUTPATIENT
Start: 2018-09-27 | End: 2018-09-28

## 2018-09-27 RX ORDER — INFLUENZA VIRUS VACCINE 15; 15; 15; 15 UG/.5ML; UG/.5ML; UG/.5ML; UG/.5ML
0.5 SUSPENSION INTRAMUSCULAR ONCE
Qty: 0 | Refills: 0 | Status: COMPLETED | OUTPATIENT
Start: 2018-09-27 | End: 2018-09-27

## 2018-09-27 RX ORDER — LEVOTHYROXINE SODIUM 125 MCG
100 TABLET ORAL DAILY
Qty: 0 | Refills: 0 | Status: DISCONTINUED | OUTPATIENT
Start: 2018-09-27 | End: 2018-09-29

## 2018-09-27 RX ORDER — SUMATRIPTAN SUCCINATE 4 MG/.5ML
50 INJECTION, SOLUTION SUBCUTANEOUS
Qty: 0 | Refills: 0 | Status: DISCONTINUED | OUTPATIENT
Start: 2018-09-27 | End: 2018-09-27

## 2018-09-27 RX ORDER — LEVOTHYROXINE SODIUM 125 MCG
125 TABLET ORAL DAILY
Qty: 0 | Refills: 0 | Status: DISCONTINUED | OUTPATIENT
Start: 2018-09-27 | End: 2018-09-27

## 2018-09-27 RX ADMIN — SUMATRIPTAN SUCCINATE 50 MILLIGRAM(S): 4 INJECTION, SOLUTION SUBCUTANEOUS at 20:36

## 2018-09-27 RX ADMIN — Medication 1 CAPSULE(S): at 22:47

## 2018-09-27 RX ADMIN — Medication 88 MICROGRAM(S): at 06:55

## 2018-09-27 RX ADMIN — Medication 20 MILLIGRAM(S): at 06:55

## 2018-09-27 RX ADMIN — Medication 1 TABLET(S): at 22:48

## 2018-09-27 RX ADMIN — Medication 20 MILLIGRAM(S): at 18:23

## 2018-09-27 RX ADMIN — SUMATRIPTAN SUCCINATE 50 MILLIGRAM(S): 4 INJECTION, SOLUTION SUBCUTANEOUS at 12:31

## 2018-09-27 RX ADMIN — SUMATRIPTAN SUCCINATE 50 MILLIGRAM(S): 4 INJECTION, SOLUTION SUBCUTANEOUS at 21:30

## 2018-09-27 RX ADMIN — Medication 100 MILLIGRAM(S): at 18:23

## 2018-09-27 RX ADMIN — SUMATRIPTAN SUCCINATE 50 MILLIGRAM(S): 4 INJECTION, SOLUTION SUBCUTANEOUS at 11:31

## 2018-09-27 RX ADMIN — Medication 1 CAPSULE(S): at 15:48

## 2018-09-27 RX ADMIN — Medication 1 TABLET(S): at 11:43

## 2018-09-27 RX ADMIN — Medication 1 CAPSULE(S): at 14:48

## 2018-09-27 RX ADMIN — ATOVAQUONE 750 MILLIGRAM(S): 750 SUSPENSION ORAL at 11:43

## 2018-09-27 RX ADMIN — Medication 1 TABLET(S): at 18:23

## 2018-09-27 RX ADMIN — POLYETHYLENE GLYCOL 3350 17 GRAM(S): 17 POWDER, FOR SOLUTION ORAL at 11:43

## 2018-09-27 RX ADMIN — Medication 1 CAPSULE(S): at 23:30

## 2018-09-27 NOTE — PROGRESS NOTE ADULT - PROBLEM SELECTOR PLAN 2
Likely due to use of Diuretics at home  Severe Hypokalemia and Hypophosphatemia  Monitor electrolytes and replace aggressively\  F/u Urine electrolytes

## 2018-09-27 NOTE — PROGRESS NOTE ADULT - PROBLEM SELECTOR PLAN 3
Recently diagnosed in July 2018  Not wheezing  C/w Prednisone 20 bid  Pt is taking Atovaquone as ppx for PCP as she is on steroids  Dr Sam (Pul) [phone] contacted to get biopsy results.

## 2018-09-27 NOTE — PROGRESS NOTE ADULT - PROBLEM SELECTOR PLAN 4
Ocular dryness and diffuse joint pains  F/u Autoimmune panel  Could be underlying cause of ILD  Patient states pt has her first appointment set up with her rheumatologist at the end of November

## 2018-09-27 NOTE — CONSULT NOTE ADULT - ATTENDING COMMENTS
57 yo F with noted PMH, presenting with likely vagally-medicated syncopal episode, potentially in setting of nausea due to migraines.   -Agree with echocardiogram  -Discussed with patient re: vagal symptoms and ways to mitigate response  -OP follow-up with neurologist for improving migraine control; this should help with episodes  -If syncope persists, would send to EP for evaluation for loop recorder. Can see Dr. Hines at [phone]

## 2018-09-27 NOTE — PROGRESS NOTE ADULT - SUBJECTIVE AND OBJECTIVE BOX
PGY 1 Note discussed with supervising resident and primary attending    Patient is a 57y old  Female who presents with a chief complaint of Syncope and headache (26 Sep 2018 17:52)      INTERVAL HPI/OVERNIGHT EVENTS: No acute events overnight, remains afebrile; HD stable, H/H stable, WBC WNL    MEDICATIONS  (STANDING):  atovaquone Suspension 750 milliGRAM(s) Oral daily  heparin  Injectable 5000 Unit(s) SubCutaneous every 8 hours  labetalol 100 milliGRAM(s) Oral two times a day  levothyroxine 125 MICROGram(s) Oral daily  polyethylene glycol 3350 17 Gram(s) Oral daily  potassium acid phosphate/sodium acid phosphate tablet (K-PHOS No. 2) 1 Tablet(s) Oral four times a day with meals  predniSONE   Tablet 20 milliGRAM(s) Oral every 12 hours    MEDICATIONS  (PRN):  acetaminophen 300 mG/butalbital 50 mG/ caffeine 40 mG 1 Capsule(s) Oral every 8 hours PRN Moderate Pain (4 - 6)  SUMAtriptan 50 milliGRAM(s) Oral daily PRN Migraine      __________________________________________________  REVIEW OF SYSTEMS:    CONSTITUTIONAL: No fever,   EYES: no acute visual disturbances; occular dryness  NECK: No pain or stiffness  RESPIRATORY: No cough; Shortness of breath  CARDIOVASCULAR: No chest pain, no palpitations  GASTROINTESTINAL: No pain. No nausea or vomiting; No diarrhea   NEUROLOGICAL: Headache. No numbness or tremors  MUSCULOSKELETAL: Diffuse joint pains, no muscle pain  GENITOURINARY: no dysuria, no frequency, no hesitancy      Vital Signs Last 24 Hrs  T(C): 37.1 (27 Sep 2018 07:23), Max: 37.1 (27 Sep 2018 07:23)  T(F): 98.8 (27 Sep 2018 07:23), Max: 98.8 (27 Sep 2018 07:23)  HR: 77 (27 Sep 2018 07:23) (70 - 82)  BP: 114/76 (27 Sep 2018 07:23) (89/65 - 114/76)  BP(mean): --  RR: 18 (27 Sep 2018 07:23) (18 - 19)  SpO2: 97% (27 Sep 2018 07:23) (97% - 100%)    ________________________________________________  PHYSICAL EXAM:    GENERAL: NAD  HEENT: Normocephalic;  conjunctivae and sclerae clear; moist mucous membranes;   NECK : supple  CHEST/LUNG: Clear to auscultation bilaterally with good air entry   HEART: S1 S2  regular; no murmurs, gallops or rubs  ABDOMEN: Soft, Nontender, Nondistended; Bowel sounds present  EXTREMITIES: no cyanosis; no edema; no calf tenderness  SKIN: warm and dry; no rash  NERVOUS SYSTEM:  Awake and alert; no new deficits    _________________________________________________  LABS:                        12.9   4.6   )-----------( 176      ( 27 Sep 2018 07:37 )             40.3         138  |  107  |  16  ----------------------------<  104<H>  3.6   |  24  |  1.00    Ca    10.1      27 Sep 2018 07:37  Phos  2.0       Mg     2.3         TPro  6.4  /  Alb  3.5  /  TBili  0.4  /  DBili  x   /  AST  15  /  ALT  22  /  AlkPhos  83      PT/INR - ( 26 Sep 2018 10:53 )   PT: 11.3 sec;   INR: 1.04 ratio         PTT - ( 26 Sep 2018 10:53 )  PTT:28.1 sec  Urinalysis Basic - ( 26 Sep 2018 10:59 )    Color: Yellow / Appearance: Clear / S.005 / pH: x  Gluc: x / Ketone: Negative  / Bili: Negative / Urobili: 4   Blood: x / Protein: Negative / Nitrite: Negative   Leuk Esterase: Negative / RBC: x / WBC x   Sq Epi: x / Non Sq Epi: x / Bacteria: x      CAPILLARY BLOOD GLUCOSE            RADIOLOGY & ADDITIONAL TESTS:    Imaging Personally Reviewed:  YES    Consultant(s) Notes Reviewed:   YES    Care Discussed with Consultants :     Plan of care was discussed with patient and /or primary care giver; all questions and concerns were addressed and care was aligned with patient's wishes. PGY 1 Note discussed with supervising resident and primary attending    Patient is a 57y old  Female who presents with a chief complaint of Syncope and headache (26 Sep 2018 17:52)      INTERVAL HPI/OVERNIGHT EVENTS: No acute events overnight, remains afebrile; HD stable, H/H stable, WBC WNL    MEDICATIONS  (STANDING):  atovaquone Suspension 750 milliGRAM(s) Oral daily  heparin  Injectable 5000 Unit(s) SubCutaneous every 8 hours  labetalol 100 milliGRAM(s) Oral two times a day  levothyroxine 125 MICROGram(s) Oral daily  polyethylene glycol 3350 17 Gram(s) Oral daily  potassium acid phosphate/sodium acid phosphate tablet (K-PHOS No. 2) 1 Tablet(s) Oral four times a day with meals  predniSONE   Tablet 20 milliGRAM(s) Oral every 12 hours    MEDICATIONS  (PRN):  acetaminophen 300 mG/butalbital 50 mG/ caffeine 40 mG 1 Capsule(s) Oral every 8 hours PRN Moderate Pain (4 - 6)  SUMAtriptan 50 milliGRAM(s) Oral daily PRN Migraine      __________________________________________________  REVIEW OF SYSTEMS:    CONSTITUTIONAL: No fever,   EYES: no acute visual disturbances; occular dryness  NECK: No pain or stiffness  RESPIRATORY: No cough; Shortness of breath  CARDIOVASCULAR: No chest pain, no palpitations  GASTROINTESTINAL: No pain. No nausea or vomiting; No diarrhea   NEUROLOGICAL: Headache. No numbness or tremors  MUSCULOSKELETAL: Diffuse joint pains, no muscle pain  GENITOURINARY: no dysuria, no frequency, no hesitancy      Vital Signs Last 24 Hrs  T(C): 37.1 (27 Sep 2018 07:23), Max: 37.1 (27 Sep 2018 07:23)  T(F): 98.8 (27 Sep 2018 07:23), Max: 98.8 (27 Sep 2018 07:23)  HR: 77 (27 Sep 2018 07:23) (70 - 82)  BP: 114/76 (27 Sep 2018 07:23) (89/65 - 114/76)  BP(mean): --  RR: 18 (27 Sep 2018 07:23) (18 - 19)  SpO2: 97% (27 Sep 2018 07:23) (97% - 100%)    ________________________________________________  PHYSICAL EXAM:    GENERAL: NAD  HEENT: Normocephalic;  conjunctivae and sclerae clear; moist mucous membranes;   NECK : supple  CHEST/LUNG: Clear to auscultation bilaterally with good air entry   HEART: S1 S2  regular; no murmurs, gallops or rubs  ABDOMEN: Soft, Nontender, Nondistended; Bowel sounds present  EXTREMITIES: no cyanosis; no edema; no calf tenderness  SKIN: warm and dry; no rash  NERVOUS SYSTEM:  Awake and alert; no new deficits    _________________________________________________  LABS:                        12.9   4.6   )-----------( 176      ( 27 Sep 2018 07:37 )             40.3         138  |  107  |  16  ----------------------------<  104<H>  3.6   |  24  |  1.00    Ca    10.1      27 Sep 2018 07:37  Phos  2.0       Mg     2.3         TPro  6.4  /  Alb  3.5  /  TBili  0.4  /  DBili  x   /  AST  15  /  ALT  22  /  AlkPhos  83      PT/INR - ( 26 Sep 2018 10:53 )   PT: 11.3 sec;   INR: 1.04 ratio         PTT - ( 26 Sep 2018 10:53 )  PTT:28.1 sec  Urinalysis Basic - ( 26 Sep 2018 10:59 )    Color: Yellow / Appearance: Clear / S.005 / pH: x  Gluc: x / Ketone: Negative  / Bili: Negative / Urobili: 4   Blood: x / Protein: Negative / Nitrite: Negative   Leuk Esterase: Negative / RBC: x / WBC x   Sq Epi: x / Non Sq Epi: x / Bacteria: x    RADIOLOGY & ADDITIONAL TESTS:    Imaging Personally Reviewed:  YES    Consultant(s) Notes Reviewed:   YES    Care Discussed with Consultants :     Plan of care was discussed with patient and /or primary care giver; all questions and concerns were addressed and care was aligned with patient's wishes.

## 2018-09-27 NOTE — PROGRESS NOTE ADULT - PROBLEM SELECTOR PLAN 6
TSH 89  Has chronic constipation  Synthroid increased from 88 -> 125 given low TSH  Confirmed with pt's pharmacy that she has bee elevatating Cr recetly  T1 and T2 -ve

## 2018-09-27 NOTE — PROGRESS NOTE ADULT - ASSESSMENT
57 Female from home having PMH of HTN, Asthma, CKD, Migraines, Hypothyroidism, Autoimmune interstitial lung disease who comes to the hospital for syncope and shortness of breath. Pt had a Echo and stress test in Feb 2018 which was normal.    Orthostatic BP: Laying 102/62 (68) --> Standing 124/79 (78)

## 2018-09-27 NOTE — CONSULT NOTE ADULT - SUBJECTIVE AND OBJECTIVE BOX
CHIEF COMPLAINT: Syncope and Collapse    HPI: this is 56 y.o. female with HTN, asthma, CKD, migraine, hypothyroidism, autoimmune interstitial lung disease came to ED post syncopal episode.     PAST MEDICAL & SURGICAL HISTORY:  Interstitial lung disease  Hypothyroidism  Migraine  CKD (chronic kidney disease)  Moderate persistent asthma without complication  HTN (hypertension)  History of bunionectomy  S/P cholecystectomy      Allergies    No Known Allergies    Intolerances        MEDICATIONS  (STANDING):  atovaquone Suspension 750 milliGRAM(s) Oral daily  heparin  Injectable 5000 Unit(s) SubCutaneous every 8 hours  labetalol 100 milliGRAM(s) Oral two times a day  levothyroxine 125 MICROGram(s) Oral daily  polyethylene glycol 3350 17 Gram(s) Oral daily  potassium acid phosphate/sodium acid phosphate tablet (K-PHOS No. 2) 1 Tablet(s) Oral four times a day with meals  predniSONE   Tablet 20 milliGRAM(s) Oral every 12 hours    MEDICATIONS  (PRN):  acetaminophen 300 mG/butalbital 50 mG/ caffeine 40 mG 1 Capsule(s) Oral every 8 hours PRN Moderate Pain (4 - 6)  SUMAtriptan 50 milliGRAM(s) Oral daily PRN Migraine      FAMILY HISTORY:  Family history of ovarian cancer (Mother)  Family history of stomach cancer (Father)  Family history of cerebrovascular accident (CVA) (Mother)  Family history of coronary artery disease (Father)  Family history of hypertension (Mother)    No family history of premature coronary artery disease or sudden cardiac death    SOCIAL HISTORY:  Smoking-  Alcohol-  Ilicit Drug use-    REVIEW OF SYSTEMS:  Constitutional: [ ] fever, [ ]weight loss,  [ ]fatigue  Eyes: [ ] visual changes  Respiratory: [ ]shortness of breath;  [ ] cough, [ ]wheezing, [ ]chills, [ ]hemoptysis  Cardiovascular: [ ] chest pain, [ ]palpitations, [ ]dizziness,  [ ]leg swelling  Gastrointestinal: [ ] abdominal pain, [ ]nausea, [ ]vomiting,  [ ]diarrhea   Genitourinary: [ ] dysuria, [ ] hematuria  Neurologic: [ ] headaches [ ] tremors  [ ] weakness [ ] lightheadedness  Skin: [ ] itching, [ ]burning, [ ] rashes  Endocrine: [ ] heat or cold intolerance  Musculoskeletal: [ ] joint pain or swelling; [ ] muscle, back, or extremity pain  Psychiatric: [ ] depression, [ ]anxiety, [ ]mood swings, or [ ]difficulty sleeping  Hematologic: [ ] easy bruising, [ ] bleeding gums       [ x] All others negative	  [ ] Unable to obtain    Vital Signs Last 24 Hrs  T(C): 36.9 (27 Sep 2018 11:49), Max: 37.1 (27 Sep 2018 07:23)  T(F): 98.4 (27 Sep 2018 11:49), Max: 98.8 (27 Sep 2018 07:23)  HR: 78 (27 Sep 2018 11:49) (70 - 82)  BP: 119/83 (27 Sep 2018 11:49) (89/65 - 119/83)  BP(mean): --  RR: 18 (27 Sep 2018 11:49) (18 - 19)  SpO2: 97% (27 Sep 2018 11:49) (97% - 100%)  I&O's Summary      PHYSICAL EXAM:  General: No acute distress  HEENT: EOMI, PERRL  Neck: Supple, No JVD  Lungs: Clear to auscultation bilaterally; No rales or wheezing  Heart: Regular rate and rhythm; No murmurs, rubs, or gallops  Abdomen: Nontender, bowel sounds present  Extremities: No clubbing, cyanosis, or edema  Nervous system:  Alert & Oriented X3, no focal deficits  Psychiatric: Normal affect  Skin: No rashes or lesions      LABS:  09-27    138  |  107  |  16  ----------------------------<  104<H>  3.6   |  24  |  1.00    Ca    10.1      27 Sep 2018 07:37  Phos  2.0     09-27  Mg     2.3     09-27    TPro  6.4  /  Alb  3.5  /  TBili  0.4  /  DBili  x   /  AST  15  /  ALT  22  /  AlkPhos  83  09-27    Creatinine Trend: 1.00<--, 1.05<--, 1.43<--                        12.9   4.6   )-----------( 176      ( 27 Sep 2018 07:37 )             40.3     PT/INR - ( 26 Sep 2018 10:53 )   PT: 11.3 sec;   INR: 1.04 ratio         PTT - ( 26 Sep 2018 10:53 )  PTT:28.1 sec    Lipid Panel:   Cardiac Enzymes: CARDIAC MARKERS ( 27 Sep 2018 07:37 )  <0.015 ng/mL / x     / 94 U/L / x     / 1.1 ng/mL  CARDIAC MARKERS ( 26 Sep 2018 11:05 )  <0.015 ng/mL / x     / x     / x     / x            09-27 [id number] 4.4      RADIOLOGY: < from: Xray Chest 2 Views PA/Lat (09.26.18 @ 12:01) >  Trace right pleural effusion. No consolidation.    Heart size within normal limits.      < end of copied text >      ECG: < from: 12 Lead ECG (09.26.18 @ 10:23) >  Diagnosis Line Normal sinus rhythm  Incomplete right bundle branch block  Nonspecific ST and T wave abnormality  Abnormal ECG    < end of copied text >      TELEMETRY:    ECHO:     STRESS TEST:     CATHETERIZATION: CHIEF COMPLAINT: Syncope and Collapse    HPI: This is 57 y.o. female with HTN, asthma, CKD, migraine, hypothyroidism, autoimmune interstitial lung disease came to ED post syncopal episode that occured  morning when she woke up she felt nauseous, got up and felt dizzy, walked 10 feet to her kitchen sat down and put her head back and the next thing she remembered she was on the floor. Denies any prodrome of chest pains, dyspnea, palpitations. She doesn't recall how long she was on the floor. When she woke up she had an urge to go to bathroom with a pounding headache. Patient had similar episode yesterday, but this time she did not pass out prompting her to come to the hospital. In 2018, she passed out and hurt her chin, went to nearest urgent center and was told that it was related to her Labetalol dose increase. Patient has thyroid problem and was told her thyroid has been "out of whack", she has not taken her medications for almost a month, then when she was re-started last week Thursday, she ended up taking double doses for a couple of times which caused her to have palpitations. She has been feeling exhausted and dyspneic this past year, especially she can no longer take 2 flights of stairs and needs frequent rest periods. Associates her symptoms to her lung disease, takes steroids for a month and SOB has been improving since then. She has stopped taking her steroids on her own few times, due to insomnia, but then resumed in fear of symptom exacerbation. Patient reports occasional chest pains described as more of "kevin" in nature, she associates it with acid reflux, relieved by antiacid medications. She cooks meals herself and mostly eats healthy food, full of salads and fish. Denies any fever, chills, recent sicknesses or any recent sick contacts. She was able to lay flat for 10 minutes and raised the head of the bed due to discomfort. Patient reports having negative echo and nuclear stress tests in 2018 with her cardiologist Dr. Finkelstein, who she follows up on a regular basis for her HTN.     PAST MEDICAL & SURGICAL HISTORY:  Interstitial lung disease  Hypothyroidism  Migraine  CKD (chronic kidney disease)  Moderate persistent asthma without complication  HTN (hypertension)  History of bunionectomy  S/P cholecystectomy      Allergies    No Known Allergies    Intolerances        MEDICATIONS  (STANDING):  atovaquone Suspension 750 milliGRAM(s) Oral daily  heparin  Injectable 5000 Unit(s) SubCutaneous every 8 hours  labetalol 100 milliGRAM(s) Oral two times a day  levothyroxine 125 MICROGram(s) Oral daily  polyethylene glycol 3350 17 Gram(s) Oral daily  potassium acid phosphate/sodium acid phosphate tablet (K-PHOS No. 2) 1 Tablet(s) Oral four times a day with meals  predniSONE   Tablet 20 milliGRAM(s) Oral every 12 hours    MEDICATIONS  (PRN):  acetaminophen 300 mG/butalbital 50 mG/ caffeine 40 mG 1 Capsule(s) Oral every 8 hours PRN Moderate Pain (4 - 6)  SUMAtriptan 50 milliGRAM(s) Oral daily PRN Migraine      FAMILY HISTORY:  Family history of ovarian cancer (Mother)  Family history of stomach cancer (Father)  Family history of cerebrovascular accident (CVA) (Mother)  Family history of coronary artery disease (Father)  Family history of hypertension (Mother)  Brother  at age of 45 from aneurism/ stroke  No family history of premature coronary artery disease or sudden cardiac death    SOCIAL HISTORY:  Smoking-quit 18 years ago, used to smoke 10 pack years   Alcohol-denies  Ilicit Drug use-denies    REVIEW OF SYSTEMS:  Constitutional: [ ] fever, [ ]weight loss,  [X ]fatigue  Eyes: [ ] visual changes  Respiratory: [ ]shortness of breath;  [ ] cough, [ ]wheezing, [ ]chills, [ ]hemoptysis  Cardiovascular: [X ] chest pain, [X ]palpitations, [ ]dizziness,  [ ]leg swelling  Gastrointestinal: [ ] abdominal pain, [ X]nausea, [ ]vomiting,  [ ]diarrhea   Genitourinary: [ ] dysuria, [ ] hematuria  Neurologic: [X ] headaches [ ] tremors  [ ] weakness [X ] lightheadedness  Skin: [ ] itching, [ ]burning, [ ] rashes  Endocrine: [ ] heat or cold intolerance  Musculoskeletal: [ ] joint pain or swelling; [ ] muscle, back, or extremity pain  Psychiatric: [ ] depression, [ ]anxiety, [ ]mood swings, or [X ]difficulty sleeping  Hematologic: [ ] easy bruising, [ ] bleeding gums       [ x] All others negative	  [ ] Unable to obtain    Vital Signs Last 24 Hrs  T(C): 36.9 (27 Sep 2018 11:49), Max: 37.1 (27 Sep 2018 07:23)  T(F): 98.4 (27 Sep 2018 11:49), Max: 98.8 (27 Sep 2018 07:23)  HR: 78 (27 Sep 2018 11:49) (70 - 82)  BP: 119/83 (27 Sep 2018 11:49) (89/65 - 119/83)  BP(mean): --  RR: 18 (27 Sep 2018 11:49) (18 - 19)  SpO2: 97% (27 Sep 2018 11:49) (97% - 100%)  I&O's Summary      PHYSICAL EXAM:  General: No acute distress  HEENT: EOMI, PERRL  Neck: Supple, No JVD  Lungs: Clear to auscultation bilaterally; No rales or wheezing  Heart: Regular rate and rhythm; No murmurs, rubs, or gallops  Abdomen: Nontender, bowel sounds present  Extremities: No clubbing, cyanosis, or edema  Nervous system:  Alert & Oriented X3, no focal deficits  Psychiatric: Normal affect  Skin: No rashes or lesions      LABS:      138  |  107  |  16  ----------------------------<  104<H>  3.6   |  24  |  1.00    Ca    10.1      27 Sep 2018 07:37  Phos  2.0       Mg     2.3         TPro  6.4  /  Alb  3.5  /  TBili  0.4  /  DBili  x   /  AST  15  /  ALT  22  /  AlkPhos  83      Creatinine Trend: 1.00<--, 1.05<--, 1.43<--                        12.9   4.6   )-----------( 176      ( 27 Sep 2018 07:37 )             40.3     PT/INR - ( 26 Sep 2018 10:53 )   PT: 11.3 sec;   INR: 1.04 ratio         PTT - ( 26 Sep 2018 10:53 )  PTT:28.1 sec    Lipid Panel:   Cardiac Enzymes: CARDIAC MARKERS ( 27 Sep 2018 07:37 )  <0.015 ng/mL / x     / 94 U/L / x     / 1.1 ng/mL  CARDIAC MARKERS ( 26 Sep 2018 11:05 )  <0.015 ng/mL / x     / x     / x     / x             [id number] 4.4      RADIOLOGY: < from: Xray Chest 2 Views PA/Lat (18 @ 12:01) >  Trace right pleural effusion. No consolidation.    Heart size within normal limits.      < end of copied text >      ECG: < from: 12 Lead ECG (18 @ 10:23) >  Diagnosis Line Normal sinus rhythm  Incomplete right bundle branch block  Nonspecific ST and T wave abnormality  Abnormal ECG    < end of copied text >      TELEMETRY: SR with missed beats HR 72-81's    ECHO: Pending    STRESS TEST:     CATHETERIZATION:

## 2018-09-27 NOTE — CONSULT NOTE ADULT - ASSESSMENT
This is 57 y.o. female from home with HTN, asthma, CKD, migraine, autoimmune interstitial lung disease This is 57 y.o. female from home with HTN, asthma, CKD, migraine, autoimmune interstitial lung disease who presented after syncopal episodes.    1) Syncope: This is most likely due to increased vasovagal response, considering prodrome symptoms of nausea. Patient needs to be educated on recognizing prodrome triggers, and learn to avoid them.    2) Hypothyroidism, TSH abnormal-would recommend getting echo done to R/O any pericardial effusion.    Due to recent stress test, and absence of troponins, any acute EKG changes and atypical symptoms would not do any in-patient ischemic work-up, rather can follow-up with out-patient cardiologist. If possible please obtain results of echo and stress test done in February of 2018.      These are preliminary recommendations and should be not followed until note is signed by attending cardiologist. This is 57 y.o. female from home with HTN, asthma, CKD, migraine, autoimmune interstitial lung disease who presented after syncopal episodes.    1) Syncope: This is most likely due to increased vasovagal response, considering prodrome symptoms of nausea. Patient needs to be educated on recognizing prodrome triggers, and learn to avoid them.    2) Hypothyroidism, TSH abnormal-would recommend getting echo done to R/O any pericardial effusion.    Due to recent stress test, and absence of troponins, any acute EKG changes and atypical symptoms would not do any in-patient ischemic work-up, rather can follow-up with out-patient cardiologist. If possible please obtain results of echo and stress test done in February of 2018.

## 2018-09-27 NOTE — PROGRESS NOTE ADULT - PROBLEM SELECTOR PLAN 1
Severe headache & nausea leading to Syncope  Likely due to Chronic history of Migraines vs hypotension  CT head: Normal  Orthostatics Negative as patient got Bolus 1 Lit  No need of MRI as there is since no focal deficit   EKG: Normal sinus, Mild LBBB  Normal cardiac enzymes  Recent ECHO and Stress test normal in Feb 2018 Cardiology: Dr. Hernandez  Severe headache & nausea leading to Syncope  Likely due to Chronic history of Migraines vs hypotension  CT head: Normal  Orthostatics Negative as patient got Bolus 1 Lit  No need of MRI as there is since no focal deficit   EKG: Normal sinus, Mild LBBB  Normal cardiac enzymes  Recent ECHO and Stress test normal in Feb 2018

## 2018-09-27 NOTE — PROGRESS NOTE ADULT - ATTENDING COMMENTS
Patient seen and examined; Agree with PGY1 A/P above with editing as needed. Discussed with Dr. BECKY 1Dr. Tray     57 Female from home having PMH of HTN, Asthma, CKD, Migraines, Hypothyroidism, Autoimmune interstitial lung disease who comes to the hospital for syncope and shortness of breath. Pt had a Echo and stress test in Feb 2018 which was normal.    patient was found to have electrolyte imbalances as well as low BP on admission. Given fluids with improvement. Denies any chest pain or SOB at this time. No fever or cough. No abdominal pain or urinary complaints    Vital Signs Last 24 Hrs  T(C): 36.8 (27 Sep 2018 15:20), Max: 37.1 (27 Sep 2018 07:23)  T(F): 98.2 (27 Sep 2018 15:20), Max: 98.8 (27 Sep 2018 07:23)  HR: 71 (27 Sep 2018 15:20) (70 - 82)  BP: 127/71 (27 Sep 2018 15:20) (89/65 - 127/71)  RR: 18 (27 Sep 2018 15:20) (18 - 19)  SpO2: 98% (27 Sep 2018 15:20) (97% - 100%)    P/E:  Neuro: AAO x3 ; No focal deficits  CVS; S1S2 present, Regular; No rales  Resp: BLAE+, NO wheeze or Rhonchi  GI: Soft, BS+, NT, ND  Extr: No edema or calf tenderness B/L LE    Labs:                        12.9   4.6   )-----------( 176      ( 27 Sep 2018 07:37 )             40.3   09-27    138  |  107  |  16  ----------------------------<  104<H>  3.6   |  24  |  1.00    Ca    10.1      27 Sep 2018 07:37  Phos  2.0     09-27  Mg     2.3     09-27    TPro  6.4  /  Alb  3.5  /  TBili  0.4  /  DBili  x   /  AST  15  /  ALT  22  /  AlkPhos  83  09-27    CT Angio Chest w/ IV Cont (09.26.18 @ 14:38)    IMPRESSION:     Limited study. No pulmonary embolism in the main pulmonary arteries as described above.    Status post left lung wedge resections with postoperative changes. Comparison with prior studies would be helpful for further evaluation if   images are made available, an addendum can be issued.    D/D:  Syncope likely Vasovagal likely Hypovolemic related versus Migraine  No evidence of ACS, concern for Arrhythmia  NIKOLE due to dehydration/ Hypovolemia likely  Inadequately treated Hypothyroidism due to Non compliance  Hypokalemia and Hypophosphatemia  Hx Interstitial lung disease secondary to autoimmune disease ?? RA    Plan:  Repeat Orthostatics in AM  Will get a new Echo as previous Echo 6 months ago and patient has an episode now  Cardiology evaluation awaited  Continue Prednisone; Taper as per Pulmonary/ Rheumatologist  Continue Atovaquone for PCP prophylaxis  Resume Telmesartan if BP elevated as Creatinine normalized; Hold off Diuretics  If Headache persist will consider Neurology eval in AM; Fioricet as needed    Discussed with patient findings and plan of care  Discussed with PGy1 Dr. Feliz and PGY2 Dr. Honeycutt  Will encourage to schedule Rheumatology apt. for definitive therapy and tapering of steroids sooner.    Discussed with PGY 1 Dr. Feliz and PGY 3 Dr. Honeycutt

## 2018-09-28 DIAGNOSIS — R51 HEADACHE: ICD-10-CM

## 2018-09-28 LAB
ANA TITR SER: NEGATIVE — SIGNIFICANT CHANGE UP
ANION GAP SERPL CALC-SCNC: 8 MMOL/L — SIGNIFICANT CHANGE UP (ref 5–17)
AUTO DIFF PNL BLD: NEGATIVE — SIGNIFICANT CHANGE UP
BASOPHILS # BLD AUTO: 0 K/UL — SIGNIFICANT CHANGE UP (ref 0–0.2)
BASOPHILS NFR BLD AUTO: 1 % — SIGNIFICANT CHANGE UP (ref 0–2)
BUN SERPL-MCNC: 15 MG/DL — SIGNIFICANT CHANGE UP (ref 7–18)
C-ANCA SER-ACNC: NEGATIVE — SIGNIFICANT CHANGE UP
CALCIUM SERPL-MCNC: 10 MG/DL — SIGNIFICANT CHANGE UP (ref 8.4–10.5)
CHLORIDE SERPL-SCNC: 110 MMOL/L — HIGH (ref 96–108)
CO2 SERPL-SCNC: 25 MMOL/L — SIGNIFICANT CHANGE UP (ref 22–31)
CREAT ?TM UR-MCNC: 132 MG/DL — SIGNIFICANT CHANGE UP
CREAT SERPL-MCNC: 0.98 MG/DL — SIGNIFICANT CHANGE UP (ref 0.5–1.3)
DSDNA AB SER-ACNC: <12 IU/ML — SIGNIFICANT CHANGE UP
EOSINOPHIL # BLD AUTO: 0 K/UL — SIGNIFICANT CHANGE UP (ref 0–0.5)
EOSINOPHIL NFR BLD AUTO: 0.1 % — SIGNIFICANT CHANGE UP (ref 0–6)
GLUCOSE SERPL-MCNC: 93 MG/DL — SIGNIFICANT CHANGE UP (ref 70–99)
HCT VFR BLD CALC: 36 % — SIGNIFICANT CHANGE UP (ref 34.5–45)
HGB BLD-MCNC: 11.6 G/DL — SIGNIFICANT CHANGE UP (ref 11.5–15.5)
LYMPHOCYTES # BLD AUTO: 0.8 K/UL — LOW (ref 1–3.3)
LYMPHOCYTES # BLD AUTO: 19.5 % — SIGNIFICANT CHANGE UP (ref 13–44)
MCHC RBC-ENTMCNC: 27.1 PG — SIGNIFICANT CHANGE UP (ref 27–34)
MCHC RBC-ENTMCNC: 32.3 GM/DL — SIGNIFICANT CHANGE UP (ref 32–36)
MCV RBC AUTO: 83.9 FL — SIGNIFICANT CHANGE UP (ref 80–100)
MONOCYTES # BLD AUTO: 0.2 K/UL — SIGNIFICANT CHANGE UP (ref 0–0.9)
MONOCYTES NFR BLD AUTO: 5.4 % — SIGNIFICANT CHANGE UP (ref 2–14)
NEUTROPHILS # BLD AUTO: 2.9 K/UL — SIGNIFICANT CHANGE UP (ref 1.8–7.4)
NEUTROPHILS NFR BLD AUTO: 74 % — SIGNIFICANT CHANGE UP (ref 43–77)
P-ANCA SER-ACNC: NEGATIVE — SIGNIFICANT CHANGE UP
PHOSPHATE SERPL-MCNC: 2.5 MG/DL — SIGNIFICANT CHANGE UP (ref 2.5–4.5)
PLATELET # BLD AUTO: 153 K/UL — SIGNIFICANT CHANGE UP (ref 150–400)
POTASSIUM SERPL-MCNC: 3.5 MMOL/L — SIGNIFICANT CHANGE UP (ref 3.5–5.3)
POTASSIUM SERPL-SCNC: 3.5 MMOL/L — SIGNIFICANT CHANGE UP (ref 3.5–5.3)
POTASSIUM UR-SCNC: 21 MMOL/L — LOW (ref 25–125)
PROT ?TM UR-MCNC: 15 MG/DL — HIGH (ref 0–12)
RBC # BLD: 4.28 M/UL — SIGNIFICANT CHANGE UP (ref 3.8–5.2)
RBC # FLD: 15.7 % — HIGH (ref 10.3–14.5)
SODIUM SERPL-SCNC: 143 MMOL/L — SIGNIFICANT CHANGE UP (ref 135–145)
SODIUM UR-SCNC: 47 MMOL/L — SIGNIFICANT CHANGE UP (ref 40–220)
THYROPEROXIDASE AB SERPL-ACNC: <10 IU/ML — SIGNIFICANT CHANGE UP (ref 0–34.9)
WBC # BLD: 3.9 K/UL — SIGNIFICANT CHANGE UP (ref 3.8–10.5)
WBC # FLD AUTO: 3.9 K/UL — SIGNIFICANT CHANGE UP (ref 3.8–10.5)

## 2018-09-28 PROCEDURE — 99254 IP/OBS CNSLTJ NEW/EST MOD 60: CPT

## 2018-09-28 PROCEDURE — 99233 SBSQ HOSP IP/OBS HIGH 50: CPT | Mod: GC

## 2018-09-28 PROCEDURE — 99221 1ST HOSP IP/OBS SF/LOW 40: CPT

## 2018-09-28 RX ORDER — DIPHENHYDRAMINE HCL 50 MG
25 CAPSULE ORAL EVERY 6 HOURS
Qty: 0 | Refills: 0 | Status: DISCONTINUED | OUTPATIENT
Start: 2018-09-28 | End: 2018-09-29

## 2018-09-28 RX ORDER — VALPROIC ACID (AS SODIUM SALT) 250 MG/5ML
500 SOLUTION, ORAL ORAL EVERY 6 HOURS
Qty: 0 | Refills: 0 | Status: DISCONTINUED | OUTPATIENT
Start: 2018-09-28 | End: 2018-09-29

## 2018-09-28 RX ORDER — BUDESONIDE AND FORMOTEROL FUMARATE DIHYDRATE 160; 4.5 UG/1; UG/1
2 AEROSOL RESPIRATORY (INHALATION)
Qty: 0 | Refills: 0 | COMMUNITY

## 2018-09-28 RX ORDER — METOCLOPRAMIDE HCL 10 MG
10 TABLET ORAL EVERY 6 HOURS
Qty: 0 | Refills: 0 | Status: DISCONTINUED | OUTPATIENT
Start: 2018-09-28 | End: 2018-09-28

## 2018-09-28 RX ORDER — ALBUTEROL 90 UG/1
0 AEROSOL, METERED ORAL
Qty: 0 | Refills: 0 | COMMUNITY

## 2018-09-28 RX ORDER — METOCLOPRAMIDE HCL 10 MG
10 TABLET ORAL EVERY 6 HOURS
Qty: 0 | Refills: 0 | Status: DISCONTINUED | OUTPATIENT
Start: 2018-09-28 | End: 2018-09-29

## 2018-09-28 RX ORDER — DIPHENHYDRAMINE HCL 50 MG
25 CAPSULE ORAL EVERY 6 HOURS
Qty: 0 | Refills: 0 | Status: DISCONTINUED | OUTPATIENT
Start: 2018-09-28 | End: 2018-09-28

## 2018-09-28 RX ADMIN — Medication 1 TABLET(S): at 20:05

## 2018-09-28 RX ADMIN — Medication 1 CAPSULE(S): at 20:05

## 2018-09-28 RX ADMIN — Medication 1 TABLET(S): at 08:49

## 2018-09-28 RX ADMIN — Medication 1 CAPSULE(S): at 12:41

## 2018-09-28 RX ADMIN — Medication 20 MILLIGRAM(S): at 17:12

## 2018-09-28 RX ADMIN — Medication 1 CAPSULE(S): at 20:40

## 2018-09-28 RX ADMIN — Medication 100 MILLIGRAM(S): at 17:12

## 2018-09-28 RX ADMIN — Medication 1 TABLET(S): at 17:12

## 2018-09-28 RX ADMIN — Medication 1 CAPSULE(S): at 11:41

## 2018-09-28 RX ADMIN — Medication 1 TABLET(S): at 11:34

## 2018-09-28 RX ADMIN — Medication 100 MILLIGRAM(S): at 05:54

## 2018-09-28 RX ADMIN — Medication 20 MILLIGRAM(S): at 05:54

## 2018-09-28 RX ADMIN — Medication 27.5 MILLIGRAM(S): at 23:55

## 2018-09-28 RX ADMIN — Medication 27.5 MILLIGRAM(S): at 17:11

## 2018-09-28 RX ADMIN — Medication 25 MILLIGRAM(S): at 20:04

## 2018-09-28 RX ADMIN — Medication 100 MICROGRAM(S): at 05:54

## 2018-09-28 RX ADMIN — ATOVAQUONE 750 MILLIGRAM(S): 750 SUSPENSION ORAL at 11:34

## 2018-09-28 RX ADMIN — POLYETHYLENE GLYCOL 3350 17 GRAM(S): 17 POWDER, FOR SOLUTION ORAL at 11:34

## 2018-09-28 RX ADMIN — Medication 10 MILLIGRAM(S): at 20:05

## 2018-09-28 NOTE — PROGRESS NOTE ADULT - SUBJECTIVE AND OBJECTIVE BOX
PGY 1 Note discussed with supervising resident and primary attending    Patient is a 57y old  Female who presents with a chief complaint of Syncope and headache (27 Sep 2018 13:13)      INTERVAL HPI/OVERNIGHT EVENTS: No acute events overnight, remains afebrile; HD stable, H/H stable, WBC WNL    MEDICATIONS  (STANDING):  atovaquone Suspension 750 milliGRAM(s) Oral daily  diphenhydrAMINE   Injectable 25 milliGRAM(s) IV Push every 6 hours  heparin  Injectable 5000 Unit(s) SubCutaneous every 8 hours  labetalol 100 milliGRAM(s) Oral two times a day  levothyroxine 100 MICROGram(s) Oral daily  metoclopramide Injectable 10 milliGRAM(s) IV Push every 6 hours  polyethylene glycol 3350 17 Gram(s) Oral daily  potassium acid phosphate/sodium acid phosphate tablet (K-PHOS No. 2) 1 Tablet(s) Oral four times a day with meals  predniSONE   Tablet 20 milliGRAM(s) Oral every 12 hours  valproate sodium IVPB 500 milliGRAM(s) IV Intermittent every 6 hours    MEDICATIONS  (PRN):  acetaminophen 300 mG/butalbital 50 mG/ caffeine 40 mG 1 Capsule(s) Oral every 8 hours PRN Moderate Pain (4 - 6)      __________________________________________________  REVIEW OF SYSTEMS:    CONSTITUTIONAL: No fever,   EYES: no acute visual disturbances  NECK: No pain or stiffness  RESPIRATORY: No cough; No shortness of breath  CARDIOVASCULAR: No chest pain, no palpitations  GASTROINTESTINAL: No pain. No nausea or vomiting; No diarrhea   NEUROLOGICAL: Headache; No numbness, no tremors  MUSCULOSKELETAL: No joint pain, no muscle pain  GENITOURINARY: no dysuria, no frequency, no hesitancy      Vital Signs Last 24 Hrs  T(C): 36.7 (28 Sep 2018 15:30), Max: 37.1 (27 Sep 2018 23:30)  T(F): 98 (28 Sep 2018 15:30), Max: 98.8 (27 Sep 2018 23:30)  HR: 86 (28 Sep 2018 15:30) (68 - 88)  BP: 119/69 (28 Sep 2018 15:30) (113/79 - 123/79)  BP(mean): --  RR: 17 (28 Sep 2018 15:30) (17 - 18)  SpO2: 97% (28 Sep 2018 15:30) (95% - 100%)    ________________________________________________  PHYSICAL EXAM:    GENERAL: NAD  HEENT: Normocephalic;  conjunctivae and sclerae clear; moist mucous membranes;   NECK : supple  CHEST/LUNG: Clear to auscultation bilaterally with good air entry   HEART: S1 S2  regular; no murmurs, gallops or rubs  ABDOMEN: Soft, Nontender, Nondistended; Bowel sounds present  EXTREMITIES: no cyanosis; no edema; no calf tenderness  SKIN: warm and dry; no rash  NERVOUS SYSTEM:  Awake and alert; no new deficits    _________________________________________________  LABS:                        11.6   3.9   )-----------( 153      ( 28 Sep 2018 06:32 )             36.0     09-28    143  |  110<H>  |  15  ----------------------------<  93  3.5   |  25  |  0.98    Ca    10.0      28 Sep 2018 06:32  Phos  2.0     09-27  Mg     2.3     09-27    TPro  6.4  /  Alb  3.5  /  TBili  0.4  /  DBili  x   /  AST  15  /  ALT  22  /  AlkPhos  83  09-27        CAPILLARY BLOOD GLUCOSE            RADIOLOGY & ADDITIONAL TESTS:    Imaging Personally Reviewed:  YES    Consultant(s) Notes Reviewed:   YES    Care Discussed with Consultants :     Plan of care was discussed with patient and /or primary care giver; all questions and concerns were addressed and care was aligned with patient's wishes.

## 2018-09-28 NOTE — PROGRESS NOTE ADULT - PROBLEM SELECTOR PLAN 4
Recently diagnosed in July 2018  Not wheezing  C/w Prednisone 20 bid. Taper as per Pulmonary/ Rheumatologist  Pt is taking Atovaquone as ppx for PCP as she is on steroids  Dr Sam (Pul) [phone]: Get biopsy results in AM

## 2018-09-28 NOTE — PROGRESS NOTE ADULT - PROBLEM SELECTOR PLAN 2
Severe headache & Nausea leading to Syncope. Syncope likely vasovagal in nature 2/2 hypovolemia vs. migraine.   CT head: Normal  Orthostatics Negative as patient got Bolus 1 Lit  No need of MRI as there is no focal deficit   EKG: Normal sinus, Mild LBBB  Normal cardiac enzymes  Recent Echo and Stress test normal in Feb 2018, no need for ischemic workup  D-dimer elevated to 345, subsequent CTA r/o PE  Repeat orthostatics pressure in AM

## 2018-09-28 NOTE — PROGRESS NOTE ADULT - PROBLEM SELECTOR PLAN 5
Ocular dryness and diffuse joint pains  F/u Autoimmune panel  Could be underlying cause of ILD  Call Rheumatologist in AM

## 2018-09-28 NOTE — PROGRESS NOTE ADULT - ATTENDING COMMENTS
Patient seen and examined earlier this afternoon around 12.15 PM; Agree with PGY1 A/P above with editing as needed. Discussed with Dr. BECKY 1DrHailey Feliz and PGY3     patient doing well. Has significant Headache. No fever. No chest pain or SOB.     Vital Signs Last 24 Hrs  T(C): 36.7 (28 Sep 2018 15:30), Max: 37.1 (27 Sep 2018 23:30)  T(F): 98 (28 Sep 2018 15:30), Max: 98.8 (27 Sep 2018 23:30)  HR: 86 (28 Sep 2018 15:30) (68 - 88)  BP: 119/69 (28 Sep 2018 15:30) (113/79 - 123/79)  RR: 17 (28 Sep 2018 15:30) (17 - 18)  SpO2: 97% (28 Sep 2018 15:30) (95% - 100%)    P/E:  Neuro: AAO x3 ; No focal deficits  CVS; S1S2 present, Regular; No rales  Resp: BLAE+, NO wheeze or Rhonchi  GI: Soft, BS+, NT, ND  Extr: No edema or calf tenderness B/L LE    Labs:                        11.6   3.9   )-----------( 153      ( 28 Sep 2018 06:32 )             36.0   09-28    143  |  110<H>  |  15  ----------------------------<  93  3.5   |  25  |  0.98    Ca    10.0      28 Sep 2018 06:32    TPro  6.4  /  Alb  3.5  /  TBili  0.4  /  DBili  x   /  AST  15  /  ALT  22  /  AlkPhos  83  09-27        D/D:  Syncope likely Vasovagal likely Hypovolemic related   Migraine without Aura  NIKOLE due to dehydration/ Hypovolemia likely  Inadequately treated Hypothyroidism due to Non compliance  Hypokalemia and Hypophosphatemia  Hx Interstitial lung disease secondary to autoimmune disease ?? RA    Plan:  Await Echo report; preliminary negative as per Tech.   Cardiology evaluation awaited  Continue Prednisone; Taper as per Pulmonary/ Rheumatologist  PGY3 d/w Patient Pulmonologist and obtained fax of the biopsy report  They will follow up and plan to taper off Prednisone once on DMARDs  Continue Atovaquone for PCP prophylaxis  Resume Telmisartan if BP elevated  Neuro consult appreciated;   [place] od Depakote IV followed by Benadryl followed by reglan x 4 doses  Once improved will place on Topamax low dose and titrate up to 50 mg twice daily  d/w Dr. Hancock, will follow up in AM and further recommendations  D/C Plan in next 24 to 48 hrs if Headache resolved    Discussed with patient findings and plan of care  Discussed with PGy1 Dr. Feliz and PGY2 Dr. Honeycutt Patient seen and examined earlier this afternoon around 12.15 PM; Agree with PGY1 A/P above with editing as needed. Discussed with Dr. BECKY 1DrHailey Feliz and PGY3     patient doing well. Has significant Headache. No fever. No chest pain or SOB.     Vital Signs Last 24 Hrs  T(C): 36.7 (28 Sep 2018 15:30), Max: 37.1 (27 Sep 2018 23:30)  T(F): 98 (28 Sep 2018 15:30), Max: 98.8 (27 Sep 2018 23:30)  HR: 86 (28 Sep 2018 15:30) (68 - 88)  BP: 119/69 (28 Sep 2018 15:30) (113/79 - 123/79)  RR: 17 (28 Sep 2018 15:30) (17 - 18)  SpO2: 97% (28 Sep 2018 15:30) (95% - 100%)    P/E:  Neuro: AAO x3 ; No focal deficits  CVS; S1S2 present, Regular; No rales  Resp: BLAE+, NO wheeze or Rhonchi  GI: Soft, BS+, NT, ND  Extr: No edema or calf tenderness B/L LE    Labs:                        11.6   3.9   )-----------( 153      ( 28 Sep 2018 06:32 )             36.0   09-28    143  |  110<H>  |  15  ----------------------------<  93  3.5   |  25  |  0.98    Ca    10.0      28 Sep 2018 06:32    TPro  6.4  /  Alb  3.5  /  TBili  0.4  /  DBili  x   /  AST  15  /  ALT  22  /  AlkPhos  83  09-27        D/D:  Syncope likely Vasovagal likely Hypovolemic related   Migraine without Aura  NIKOLE due to dehydration/ Hypovolemia likely  Inadequately treated Hypothyroidism due to Non compliance  Hypokalemia and Hypophosphatemia  Hx Interstitial lung disease secondary to autoimmune disease ?? RA    Plan:  Await Echo report; preliminary negative as per Tech.   Cardiology evaluation awaited  Continue Prednisone; Taper as per Pulmonary/ Rheumatologist  PGY3 d/w Patient Pulmonologist and obtained fax of the biopsy report  They will follow up and plan to taper off Prednisone once on DMARDs  Continue Atovaquone for PCP prophylaxis  Resume Telmisartan if BP elevated  Neuro consult appreciated;   [place] od Depakote IV followed by Benadryl followed by reglan x 4 doses  Once improved will place on Topamax low dose and titrate up to 50 mg twice daily  d/w Dr. Hancock, will follow up in AM and further recommendations    **Patient spoke with her Endocrinologist who agrees with Synthroid 100 microgm but will prescribe as 50 microgm 2 tabs daily to easily titrate. **  D/C Plan in next 24 to 48 hrs if Headache resolved    Discussed with patient findings and plan of care  Discussed with PGy1 Dr. Feliz and PGY2 Dr. Honeycutt Patient seen and examined earlier this afternoon around 12.15 PM; Agree with PGY1 A/P above with editing as needed. Discussed with  PGY 1DrHailey Feliz and PGY3     patient doing well. Has significant Headache. No fever. No chest pain or SOB.     Vital Signs Last 24 Hrs  T(C): 36.7 (28 Sep 2018 15:30), Max: 37.1 (27 Sep 2018 23:30)  T(F): 98 (28 Sep 2018 15:30), Max: 98.8 (27 Sep 2018 23:30)  HR: 86 (28 Sep 2018 15:30) (68 - 88)  BP: 119/69 (28 Sep 2018 15:30) (113/79 - 123/79)  RR: 17 (28 Sep 2018 15:30) (17 - 18)  SpO2: 97% (28 Sep 2018 15:30) (95% - 100%)    P/E:  Neuro: AAO x3 ; No focal deficits  CVS; S1S2 present, Regular; No rales  Resp: BLAE+, NO wheeze or Rhonchi  GI: Soft, BS+, NT, ND  Extr: No edema or calf tenderness B/L LE    Labs:                        11.6   3.9   )-----------( 153      ( 28 Sep 2018 06:32 )             36.0   09-28    143  |  110<H>  |  15  ----------------------------<  93  3.5   |  25  |  0.98    Ca    10.0      28 Sep 2018 06:32    TPro  6.4  /  Alb  3.5  /  TBili  0.4  /  DBili  x   /  AST  15  /  ALT  22  /  AlkPhos  83  09-27    Phosphorus Level, Serum (09.28.18 @ 06:34)    Phosphorus Level, Serum: 2.5 mg/dL  Magnesium, Serum (09.27.18 @ 07:37)    Magnesium, Serum: 2.3 mg/dL    D/D:  Syncope likely Vasovagal likely Hypovolemic related   Migraine without Aura  NIKOLE due to dehydration/ Hypovolemia likely resolved  Inadequately treated Hypothyroidism due to Non compliance  Hypokalemia and Hypophosphatemia resolved  Hx Interstitial lung disease secondary to autoimmune disease ?? RA    Plan:  Await Echo report; preliminary negative as per Tech.   Cardiology evaluation awaited  Continue Prednisone; Taper as per Pulmonary/ Rheumatologist  PGY3 d/w Patient Pulmonologist and obtained fax of the biopsy report  They will follow up and plan to taper off Prednisone once on DMARDs  Continue Atovaquone for PCP prophylaxis  Resume Telmisartan if BP elevated  Neuro consult appreciated;   [place] od Depakote IV followed by Benadryl followed by reglan x 4 doses  Once improved will place on Topamax low dose and titrate up to 50 mg twice daily  d/w Dr. Hancock, will follow up in AM and further recommendations    **Patient spoke with her Endocrinologist who agrees with Synthroid 100 microgm but will prescribe as 50 microgm 2 tabs daily to easily titrate. **  D/C Plan in next 24 to 48 hrs if Headache resolved    Discussed with patient findings and plan of care at length  Discussed with PGy1 Dr. Feliz and PGY2 Dr. Honeycutt

## 2018-09-29 ENCOUNTER — TRANSCRIPTION ENCOUNTER (OUTPATIENT)
Age: 58
End: 2018-09-29

## 2018-09-29 VITALS
RESPIRATION RATE: 18 BRPM | DIASTOLIC BLOOD PRESSURE: 72 MMHG | OXYGEN SATURATION: 96 % | SYSTOLIC BLOOD PRESSURE: 111 MMHG | HEART RATE: 80 BPM | TEMPERATURE: 98 F

## 2018-09-29 LAB
ANION GAP SERPL CALC-SCNC: 5 MMOL/L — SIGNIFICANT CHANGE UP (ref 5–17)
BASOPHILS # BLD AUTO: 0 K/UL — SIGNIFICANT CHANGE UP (ref 0–0.2)
BASOPHILS NFR BLD AUTO: 0.9 % — SIGNIFICANT CHANGE UP (ref 0–2)
BUN SERPL-MCNC: 15 MG/DL — SIGNIFICANT CHANGE UP (ref 7–18)
CALCIUM SERPL-MCNC: 9.9 MG/DL — SIGNIFICANT CHANGE UP (ref 8.4–10.5)
CHLORIDE SERPL-SCNC: 109 MMOL/L — HIGH (ref 96–108)
CO2 SERPL-SCNC: 25 MMOL/L — SIGNIFICANT CHANGE UP (ref 22–31)
CREAT SERPL-MCNC: 1.02 MG/DL — SIGNIFICANT CHANGE UP (ref 0.5–1.3)
EOSINOPHIL # BLD AUTO: 0 K/UL — SIGNIFICANT CHANGE UP (ref 0–0.5)
EOSINOPHIL NFR BLD AUTO: 0.3 % — SIGNIFICANT CHANGE UP (ref 0–6)
GLUCOSE SERPL-MCNC: 89 MG/DL — SIGNIFICANT CHANGE UP (ref 70–99)
HCT VFR BLD CALC: 36.2 % — SIGNIFICANT CHANGE UP (ref 34.5–45)
HGB BLD-MCNC: 11.5 G/DL — SIGNIFICANT CHANGE UP (ref 11.5–15.5)
LYMPHOCYTES # BLD AUTO: 0.9 K/UL — LOW (ref 1–3.3)
LYMPHOCYTES # BLD AUTO: 22.5 % — SIGNIFICANT CHANGE UP (ref 13–44)
MCHC RBC-ENTMCNC: 26.8 PG — LOW (ref 27–34)
MCHC RBC-ENTMCNC: 31.9 GM/DL — LOW (ref 32–36)
MCV RBC AUTO: 84.2 FL — SIGNIFICANT CHANGE UP (ref 80–100)
MONOCYTES # BLD AUTO: 0.2 K/UL — SIGNIFICANT CHANGE UP (ref 0–0.9)
MONOCYTES NFR BLD AUTO: 5.2 % — SIGNIFICANT CHANGE UP (ref 2–14)
NEUTROPHILS # BLD AUTO: 2.7 K/UL — SIGNIFICANT CHANGE UP (ref 1.8–7.4)
NEUTROPHILS NFR BLD AUTO: 71.1 % — SIGNIFICANT CHANGE UP (ref 43–77)
PLATELET # BLD AUTO: 146 K/UL — LOW (ref 150–400)
POTASSIUM SERPL-MCNC: 3.8 MMOL/L — SIGNIFICANT CHANGE UP (ref 3.5–5.3)
POTASSIUM SERPL-SCNC: 3.8 MMOL/L — SIGNIFICANT CHANGE UP (ref 3.5–5.3)
RBC # BLD: 4.3 M/UL — SIGNIFICANT CHANGE UP (ref 3.8–5.2)
RBC # FLD: 16.1 % — HIGH (ref 10.3–14.5)
SODIUM SERPL-SCNC: 139 MMOL/L — SIGNIFICANT CHANGE UP (ref 135–145)
WBC # BLD: 3.8 K/UL — SIGNIFICANT CHANGE UP (ref 3.8–10.5)
WBC # FLD AUTO: 3.8 K/UL — SIGNIFICANT CHANGE UP (ref 3.8–10.5)

## 2018-09-29 PROCEDURE — 81003 URINALYSIS AUTO W/O SCOPE: CPT

## 2018-09-29 PROCEDURE — 86225 DNA ANTIBODY NATIVE: CPT

## 2018-09-29 PROCEDURE — 84133 ASSAY OF URINE POTASSIUM: CPT

## 2018-09-29 PROCEDURE — 85652 RBC SED RATE AUTOMATED: CPT

## 2018-09-29 PROCEDURE — 85379 FIBRIN DEGRADATION QUANT: CPT

## 2018-09-29 PROCEDURE — 80048 BASIC METABOLIC PNL TOTAL CA: CPT

## 2018-09-29 PROCEDURE — 86038 ANTINUCLEAR ANTIBODIES: CPT

## 2018-09-29 PROCEDURE — 84484 ASSAY OF TROPONIN QUANT: CPT

## 2018-09-29 PROCEDURE — 83735 ASSAY OF MAGNESIUM: CPT

## 2018-09-29 PROCEDURE — 70450 CT HEAD/BRAIN W/O DYE: CPT

## 2018-09-29 PROCEDURE — 83036 HEMOGLOBIN GLYCOSYLATED A1C: CPT

## 2018-09-29 PROCEDURE — 93306 TTE W/DOPPLER COMPLETE: CPT

## 2018-09-29 PROCEDURE — 82962 GLUCOSE BLOOD TEST: CPT

## 2018-09-29 PROCEDURE — 85027 COMPLETE CBC AUTOMATED: CPT

## 2018-09-29 PROCEDURE — 82746 ASSAY OF FOLIC ACID SERUM: CPT

## 2018-09-29 PROCEDURE — 87086 URINE CULTURE/COLONY COUNT: CPT

## 2018-09-29 PROCEDURE — 84481 FREE ASSAY (FT-3): CPT

## 2018-09-29 PROCEDURE — 82553 CREATINE MB FRACTION: CPT

## 2018-09-29 PROCEDURE — 84100 ASSAY OF PHOSPHORUS: CPT

## 2018-09-29 PROCEDURE — 82570 ASSAY OF URINE CREATININE: CPT

## 2018-09-29 PROCEDURE — 86376 MICROSOMAL ANTIBODY EACH: CPT

## 2018-09-29 PROCEDURE — 71046 X-RAY EXAM CHEST 2 VIEWS: CPT

## 2018-09-29 PROCEDURE — 84439 ASSAY OF FREE THYROXINE: CPT

## 2018-09-29 PROCEDURE — 85730 THROMBOPLASTIN TIME PARTIAL: CPT

## 2018-09-29 PROCEDURE — 85610 PROTHROMBIN TIME: CPT

## 2018-09-29 PROCEDURE — 82607 VITAMIN B-12: CPT

## 2018-09-29 PROCEDURE — 84436 ASSAY OF TOTAL THYROXINE: CPT

## 2018-09-29 PROCEDURE — 71275 CT ANGIOGRAPHY CHEST: CPT

## 2018-09-29 PROCEDURE — 93005 ELECTROCARDIOGRAM TRACING: CPT

## 2018-09-29 PROCEDURE — 84156 ASSAY OF PROTEIN URINE: CPT

## 2018-09-29 PROCEDURE — 86036 ANCA SCREEN EACH ANTIBODY: CPT

## 2018-09-29 PROCEDURE — 84443 ASSAY THYROID STIM HORMONE: CPT

## 2018-09-29 PROCEDURE — 82728 ASSAY OF FERRITIN: CPT

## 2018-09-29 PROCEDURE — 86431 RHEUMATOID FACTOR QUANT: CPT

## 2018-09-29 PROCEDURE — 99285 EMERGENCY DEPT VISIT HI MDM: CPT | Mod: 25

## 2018-09-29 PROCEDURE — 84550 ASSAY OF BLOOD/URIC ACID: CPT

## 2018-09-29 PROCEDURE — 82550 ASSAY OF CK (CPK): CPT

## 2018-09-29 PROCEDURE — 84300 ASSAY OF URINE SODIUM: CPT

## 2018-09-29 PROCEDURE — 80053 COMPREHEN METABOLIC PANEL: CPT

## 2018-09-29 RX ORDER — ATOVAQUONE 750 MG/5ML
5 SUSPENSION ORAL
Qty: 999 | Refills: 0 | OUTPATIENT
Start: 2018-09-29 | End: 2018-10-13

## 2018-09-29 RX ORDER — VERAPAMIL HCL 240 MG
1 CAPSULE, EXTENDED RELEASE PELLETS 24 HR ORAL
Qty: 30 | Refills: 0 | OUTPATIENT
Start: 2018-09-29 | End: 2018-10-28

## 2018-09-29 RX ORDER — FUROSEMIDE 40 MG
1 TABLET ORAL
Qty: 0 | Refills: 0 | COMMUNITY

## 2018-09-29 RX ORDER — TELMISARTAN AND HYDROCHLOROTHIAZIDE 40; 12.5 MG/1; MG/1
1 TABLET ORAL
Qty: 0 | Refills: 0 | COMMUNITY

## 2018-09-29 RX ORDER — LABETALOL HCL 100 MG
2 TABLET ORAL
Qty: 0 | Refills: 0 | COMMUNITY

## 2018-09-29 RX ORDER — LABETALOL HCL 100 MG
1 TABLET ORAL
Qty: 0 | Refills: 0 | COMMUNITY
Start: 2018-09-29

## 2018-09-29 RX ORDER — ATOVAQUONE 750 MG/5ML
5 SUSPENSION ORAL
Qty: 0 | Refills: 0 | COMMUNITY
Start: 2018-09-29

## 2018-09-29 RX ORDER — LEVOTHYROXINE SODIUM 125 MCG
2 TABLET ORAL
Qty: 120 | Refills: 0 | OUTPATIENT
Start: 2018-09-29 | End: 2018-10-28

## 2018-09-29 RX ORDER — LEVOTHYROXINE SODIUM 125 MCG
2 TABLET ORAL
Qty: 60 | Refills: 0 | OUTPATIENT
Start: 2018-09-29 | End: 2018-10-28

## 2018-09-29 RX ORDER — TOPIRAMATE 25 MG
1 TABLET ORAL
Qty: 30 | Refills: 0 | OUTPATIENT
Start: 2018-09-29 | End: 2018-10-28

## 2018-09-29 RX ORDER — LEVOTHYROXINE SODIUM 125 MCG
1 TABLET ORAL
Qty: 0 | Refills: 0 | COMMUNITY

## 2018-09-29 RX ORDER — TOPIRAMATE 25 MG
1 TABLET ORAL
Qty: 0 | Refills: 0 | COMMUNITY

## 2018-09-29 RX ADMIN — Medication 25 MILLIGRAM(S): at 14:29

## 2018-09-29 RX ADMIN — Medication 10 MILLIGRAM(S): at 02:05

## 2018-09-29 RX ADMIN — Medication 10 MILLIGRAM(S): at 09:07

## 2018-09-29 RX ADMIN — Medication 27.5 MILLIGRAM(S): at 05:52

## 2018-09-29 RX ADMIN — Medication 100 MICROGRAM(S): at 05:52

## 2018-09-29 RX ADMIN — Medication 25 MILLIGRAM(S): at 02:04

## 2018-09-29 RX ADMIN — Medication 100 MILLIGRAM(S): at 05:52

## 2018-09-29 RX ADMIN — Medication 10 MILLIGRAM(S): at 14:28

## 2018-09-29 RX ADMIN — Medication 25 MILLIGRAM(S): at 09:07

## 2018-09-29 RX ADMIN — POLYETHYLENE GLYCOL 3350 17 GRAM(S): 17 POWDER, FOR SOLUTION ORAL at 12:14

## 2018-09-29 RX ADMIN — Medication 27.5 MILLIGRAM(S): at 12:14

## 2018-09-29 RX ADMIN — Medication 20 MILLIGRAM(S): at 05:52

## 2018-09-29 RX ADMIN — ATOVAQUONE 750 MILLIGRAM(S): 750 SUSPENSION ORAL at 12:14

## 2018-09-29 NOTE — DISCHARGE NOTE ADULT - PATIENT PORTAL LINK FT
You can access the [facility] Patient Portal, offered by [facility], by registering with the following website: [email or web address]

## 2018-09-29 NOTE — DISCHARGE NOTE ADULT - CARE PLAN
Principal Discharge DX:	Syncope and collapse  Goal:	Prevention of future recurrences  Assessment and plan of treatment:	You were diagnosed with syncope, vasovagal in type, likely secondary to hypovolemia vs. migraine. Neurology and cardiac workups performed including CT head, orthostatic pressure, cardiac enzyme, EKG, echocardiogram, CTA chest showed no significant findings. Your migraine has resolved with IV Depakone. You are being discharged with Topamax. Take your medication as directed. Follow up with your PCP within 1 week of discharge.  Secondary Diagnosis:	Interstitial lung disease  Goal:	Improvement of symptoms  Assessment and plan of treatment:	You were diagnosed with ILD. Because you are on prednisone, which is to be tapered off once your are put on DMARDs by your outpatient pulmonologist, you are being put on atovaquone to prevent Pneumocystis pneumonia. I confirmed that the prescription has been sent to your [facility] pharmacy by your outpatient physician but has not been picked up by you. Please retrieve your medication and take them as directed. Follow up with your pulmonologist within 1 week of discharge.  Secondary Diagnosis:	Autoimmune disorder  Goal:	Further diagnostic workups  Assessment and plan of treatment:	You were diagnosed with a possible autoimmune disorder. Autoimmune workups including RF, dsDNA abs, atypical ANCA, c-ANCA, p-ANCA, anti nuclear factor, thyroperoxidase abs were all neg. You reported that you set up an appointment with your rheumatologist in November, but will move the appointment to an earlier date. Follow up with your rheumatologist.  Secondary Diagnosis:	HTN (hypertension)  Goal:	SBP < 140  Assessment and plan of treatment:	You were diagnosed with hypertension. Your BP was well managed within normal limits just on your home dose labetalol. Take your home labetalol. Measure your BP frequently and if SBP rises above 140, take your other blood pressure medications. Follow up with your PCP within 1 week for further optimization of blood pressure medication regimen.  Secondary Diagnosis:	Hypothyroidism  Goal:	Continue with prescribed hormonal regimen  Assessment and plan of treatment:	You were diagnosed with hypothyroidism. You reported that you were not being complaint with the medication. Your thyroid hormone level stayed within normal limits on 100 mg of thyroxine. Take your thyroxine as prescribed. Follow up with your PCP within 1 week of discharge. Principal Discharge DX:	Syncope and collapse  Goal:	Prevention of future recurrences  Assessment and plan of treatment:	You were diagnosed with syncope, vasovagal in type, likely secondary to hypovolemia vs. migraine. Neurology and cardiac workups performed including CT head, orthostatic pressure, cardiac enzyme, EKG, echocardiogram, CTA chest showed no significant findings. Your migraine has resolved with IV Depakone. You are being discharged with Topamax. Take your medication as directed. Follow up with your PCP within 1 week of discharge.  Secondary Diagnosis:	Interstitial lung disease  Goal:	Improvement of symptoms  Assessment and plan of treatment:	You were diagnosed with ILD. Because you are on prednisone, which is to be tapered off once your are put on DMARDs by your outpatient pulmonologist, you are being put on atovaquone to prevent Pneumocystis pneumonia. I confirmed that the prescription has been sent to your [facility] pharmacy by your outpatient physician but has not been picked up by you. Please retrieve your medication and take them as directed. Follow up with your pulmonologist within 1 week of discharge.  Secondary Diagnosis:	Autoimmune disorder  Goal:	Further diagnostic workups  Assessment and plan of treatment:	You were diagnosed with a possible autoimmune disorder. Autoimmune workups including RF, dsDNA abs, atypical ANCA, c-ANCA, p-ANCA, anti nuclear factor, thyroperoxidase abs were all neg. You reported that you set up an appointment with your rheumatologist in November, but will move the appointment to an earlier date. Follow up with your rheumatologist.  Secondary Diagnosis:	HTN (hypertension)  Goal:	SBP < 140  Assessment and plan of treatment:	You have history of hypertension. Your BP was well managed within normal limits just on your home dose labetalol. Take your home labetalol. Would hold Lasix and Telmisartan/HCTZ for now as your syncope likely related to low volume status. Measure your BP frequently and if SBP rises above 140, take your other blood pressure medications. Follow up with your PCP within 1 week for further optimization of blood pressure medication regimen.  Secondary Diagnosis:	Hypothyroidism  Goal:	Continue with prescribed hormonal regimen  Assessment and plan of treatment:	You were diagnosed with hypothyroidism. You reported that you were not being complaint with the medication. Your thyroid hormone level was noted to be 89.50. You denies nay symptoms. Your synthroid dose is adjusted, take medications as prescribed. Follow up with your Endocrinologist within 1 week of discharge.

## 2018-09-29 NOTE — DISCHARGE NOTE ADULT - PLAN OF CARE
Prevention of future recurrences You were diagnosed with syncope, vasovagal in type, likely secondary to hypovolemia vs. migraine. Neurology and cardiac workups performed including CT head, orthostatic pressure, cardiac enzyme, EKG, echocardiogram, CTA chest showed no significant findings. Your migraine has resolved with IV Depakone. You are being discharged with Topamax. Take your medication as directed. Follow up with your PCP within 1 week of discharge. Improvement of symptoms You were diagnosed with ILD. Because you are on prednisone, which is to be tapered off once your are put on DMARDs by your outpatient pulmonologist, you are being put on atovaquone to prevent Pneumocystis pneumonia. I confirmed that the prescription has been sent to your [facility] pharmacy by your outpatient physician but has not been picked up by you. Please retrieve your medication and take them as directed. Follow up with your pulmonologist within 1 week of discharge. Further diagnostic workups You were diagnosed with a possible autoimmune disorder. Autoimmune workups including RF, dsDNA abs, atypical ANCA, c-ANCA, p-ANCA, anti nuclear factor, thyroperoxidase abs were all neg. You reported that you set up an appointment with your rheumatologist in November, but will move the appointment to an earlier date. Follow up with your rheumatologist. SBP < 140 You were diagnosed with hypertension. Your BP was well managed within normal limits just on your home dose labetalol. Take your home labetalol. Measure your BP frequently and if SBP rises above 140, take your other blood pressure medications. Follow up with your PCP within 1 week for further optimization of blood pressure medication regimen. Continue with prescribed hormonal regimen You were diagnosed with hypothyroidism. You reported that you were not being complaint with the medication. Your thyroid hormone level stayed within normal limits on 100 mg of thyroxine. Take your thyroxine as prescribed. Follow up with your PCP within 1 week of discharge. You have history of hypertension. Your BP was well managed within normal limits just on your home dose labetalol. Take your home labetalol. Would hold Lasix and Telmisartan/HCTZ for now as your syncope likely related to low volume status. Measure your BP frequently and if SBP rises above 140, take your other blood pressure medications. Follow up with your PCP within 1 week for further optimization of blood pressure medication regimen. You were diagnosed with hypothyroidism. You reported that you were not being complaint with the medication. Your thyroid hormone level was noted to be 89.50. You denies nay symptoms. Your synthroid dose is adjusted, take medications as prescribed. Follow up with your Endocrinologist within 1 week of discharge.

## 2018-09-29 NOTE — DISCHARGE NOTE ADULT - MEDICATION SUMMARY - MEDICATIONS TO CHANGE
I will SWITCH the dose or number of times a day I take the medications listed below when I get home from the hospital:    Synthroid 175 mcg (0.175 mg) oral tablet  -- 1 tab(s) by mouth once a day    Trokendi  mg oral capsule, extended release  -- 1 cap(s) by mouth once a day    labetalol 200 mg oral tablet  -- 2 tab(s) by mouth 2 times a day

## 2018-09-29 NOTE — CHART NOTE - NSCHARTNOTEFT_GEN_A_CORE
HIGH EFREN  MRN-[id number]  HEALTH ISSUES - PROBLEM Dx:  Headache: Headache  Need for prophylactic measure: Need for prophylactic measure  Migraine: Migraine  NIKOLE (acute kidney injury): NIKOLE (acute kidney injury)  Hypothyroidism: Hypothyroidism  HTN (hypertension): HTN (hypertension)  Interstitial lung disease: Interstitial lung disease  Electrolyte abnormality: Electrolyte abnormality  Syncope and collapse: Syncope and collapse        Dx Migraine Headache with aura and intractable migraine  Obstructive Sleep Apnea    ORTHODONTIST REFERRAL    Please evaluate and treat with orthodontic appliance to manage moderate sleep apnea

## 2018-09-29 NOTE — DISCHARGE NOTE ADULT - MEDICATION SUMMARY - MEDICATIONS TO TAKE
I will START or STAY ON the medications listed below when I get home from the hospital:    predniSONE 20 mg oral tablet  -- 1 tab(s) by mouth 2 times a day  -- Indication: For Autoimmune disorder    Fioricet oral capsule  -- 1 cap(s) by mouth every 6 hours, As Needed  -- Indication: For Migraine    verapamil 180 mg/24 hours oral capsule, extended release  -- 1 cap(s) by mouth once a day (at bedtime)   -- Avoid grapefruit and grapefruit juice while taking this medication.  It is very important that you take or use this exactly as directed.  Do not skip doses or discontinue unless directed by your doctor.  Some non-prescription drugs may aggravate your condition.  Read all labels carefully.  If a warning appears, check with your doctor before taking.  Swallow whole.  Do not crush.    -- Indication: For Migraine    Trokendi  mg oral capsule, extended release  -- 1 cap(s) by mouth once a day  -- Indication: For Migraine    labetalol 100 mg oral tablet  -- 1 tab(s) by mouth 2 times a day  -- Indication: For HTN (hypertension)    Symbicort 160 mcg-4.5 mcg/inh inhalation aerosol  -- 2 puff(s) inhaled 2 times a day  -- Indication: For Bronchodilator    albuterol 90 mcg/inh inhalation aerosol with adapter  -- Indication: For Bronchodilator    levothyroxine 50 mcg (0.05 mg) oral tablet  -- 2 tab(s) by mouth 2 times a day   -- Indication: For Hypothyroidism I will START or STAY ON the medications listed below when I get home from the hospital:    predniSONE 20 mg oral tablet  -- 1 tab(s) by mouth 2 times a day  -- Indication: For Autoimmune disorder    Fioricet oral capsule  -- 1 cap(s) by mouth every 6 hours, As Needed  -- Indication: For Migraine    verapamil 180 mg/24 hours oral capsule, extended release  -- 1 cap(s) by mouth once a day (at bedtime)   -- Avoid grapefruit and grapefruit juice while taking this medication.  It is very important that you take or use this exactly as directed.  Do not skip doses or discontinue unless directed by your doctor.  Some non-prescription drugs may aggravate your condition.  Read all labels carefully.  If a warning appears, check with your doctor before taking.  Swallow whole.  Do not crush.    -- Indication: For Migraine    Trokendi  mg oral capsule, extended release  -- 1 cap(s) by mouth once a day  -- Indication: For Migraine    labetalol 100 mg oral tablet  -- 1 tab(s) by mouth 2 times a day  -- Indication: For HTN (hypertension)    Symbicort 160 mcg-4.5 mcg/inh inhalation aerosol  -- 2 puff(s) inhaled 2 times a day  -- Indication: For Bronchodilator    albuterol 90 mcg/inh inhalation aerosol with adapter  -- Indication: For Bronchodilator    atovaquone 750 mg/5 mL oral suspension  -- 5 milliliter(s) by mouth once a day  -- Indication: For PCP infection prophylaxis    Synthroid 50 mcg (0.05 mg) oral tablet  -- 2 tab(s) by mouth once a day   -- It is very important that you take or use this exactly as directed.  Do not skip doses or discontinue unless directed by your doctor.  Medication should be taken with plenty of water.  Some non-prescription drugs may aggravate your condition.  Read all labels carefully.  If a warning appears, check with your doctor before taking.  Take medication on an empty stomach 1 hour before or 2 to 3 hours after a meal unless otherwise directed by your doctor.    -- Indication: For Hypothyroidism

## 2018-09-29 NOTE — DISCHARGE NOTE ADULT - HOSPITAL COURSE
Ms. Morales is a 57 Female from home having PMH of HTN, asthma, CKD, migraines, hypothyroidism, autoimmune interstitial lung disease who came to the hospital for syncope and shortness of breath. Pt had a ECHO and stress test in Feb 2018 which was normal.    She showed orthostatic hypotension of 102/62 (68) laying to standing 124/79 (78). Patient presented with severe headache & nausea leading to Syncope. Syncope likely vasovagal in nature 2/2 hypovolemia vs. migraine. CT head was normal. Orthostatics as negative after patient got Bolus 1 liter. No need for MRI as there is no focal deficit. EKG showed normal sinus rhythm with mild LBBB.  Cardiac enzymes were normal. Recent ECHO and Stress test were normal in Feb 2018, no need for ischemic workup. D-dimer was elevated to 345, subsequent CTA r/o PE. Repeat orthostatics pressures were negative. ECHO showed normal LVSF (EF = 55 to 60% by visual estimation) and G1DD (Impaired relaxation).    She p/w electrolyte abnormality, which was likely due to use of Diuretics at home. Electrolytes were monitored and replaced aggressively.     She was diagnosed with interstitial lung disease in July 2018. No wheezing was noted. She was continued on Prednisone 20 bid. Pt is also put on Atovaquone as ppx for PCP as she is on steroids. Pt's outpatient pulmonologist Dr. Sam was contacted at [phone]. Patient is to see Dr. Sam for follow up and Prednisone taper once pt is put on DMARDs.    Patient has ocular dryness and diffuse joint pains c/f autoimmune disorder. Autoimmune workups including RF, dsDNA abs, atypical ANCA, c-ANCA, p-ANCA, anti nuclear factor, thyroperoxidase abs were all neg. Patient states that she set up an appointment with her rheumatologist in November, but she will move the appointment to an earlier date.    For her hypertension, pt takes Telmisartan-hctz and Labetalol. Patient's BP was stable WNL during her admission. Pt was instructed to continue taking LAbetolol but was advised to resume taking Telmisartan-hctz only if BP rises above 140. She was also advised to f/u with PCP for furtehr optimization of BP regimen.    Patient had TSH 89. Patient has recently been noncompliant with his hypothyroidism medication. She was started and discharged on Synthroid 100 mg.     Patient was found to have NIKOLE with Cr 1.43. Pt was held on Telmesartan, Hctz and Lasix.     Patient has a h/o migraine on Fioricet, Imitrex, Trokendi XR as needed at home.   Pt c/o constant headache, per Neurology recs, patient was put on Depakone -> Benadryl -> Reglan Q6, with resolution of HA. She is being discharged with Topamax low dose, which is to be titrated up to 50 mg twice daily.     Referral to orthodontist for sleep apnea was provided by Dr. Hancock upon discharge.

## 2018-09-29 NOTE — DISCHARGE NOTE ADULT - PROVIDER TOKENS
FREE:[LAST:[Dr. Sam],PHONE:[phone]],FAX:[(   )    -]],FREE:[LAST:[Dr. Jeane Feliz],PHONE:[phone]],FAX:[(   )    -]]

## 2018-09-29 NOTE — DISCHARGE NOTE ADULT - MEDICATION SUMMARY - MEDICATIONS TO STOP TAKING
I will STOP taking the medications listed below when I get home from the hospital:    telmisartan-hydrochlorothiazide 80mg-25mg oral tablet  -- 1 tab(s) by mouth once a day    Lasix 40 mg oral tablet  -- 1 tab(s) by mouth once a day, As Needed

## 2018-09-29 NOTE — DISCHARGE NOTE ADULT - CARE PROVIDER_API CALL
Dr. Sam,   Phone: [phone]  Fax: (   )    -    Dr. Jeane Feliz,   Phone: [phone]  Fax: (   )    -

## 2018-12-21 NOTE — PATIENT PROFILE ADULT. - PAIN CHRONIC, PROFILE
Mechanical trip and fall prior to arrival, struck L side of chest/body during fall.  Struck head but no LOC.  No blood thinners.  Denying headaches, visual changes, numbness, tingling and weakness.  Reporting pain in R side of chest.  Reporting some slight dizziness on standing.  History obtained with Malay speaking resident.    On exam patient vital signs within normal limits, well appearing, no noted trauma to head, no midline spinal tenderness, normal ROM of all extremities without visible deformity, small abrasion to R wrist not amenable to repair, PERRL, ambulatory in Emergency Department without difficulty, some L sided inferior/lateral chest wall tenderness without palpable crepitus/bruising/deformity.    Minor head injury with intact neurologic exam, no evidence at this time of life threatening ICH, will monitor in Emergency Department with serial neurologic exams, plain films of chest to evaluate for rib fracture. no

## 2019-03-29 PROBLEM — G43.909 MIGRAINE, UNSPECIFIED, NOT INTRACTABLE, WITHOUT STATUS MIGRAINOSUS: Chronic | Status: ACTIVE | Noted: 2018-09-26

## 2019-03-29 PROBLEM — I10 ESSENTIAL (PRIMARY) HYPERTENSION: Chronic | Status: ACTIVE | Noted: 2018-09-26

## 2019-03-29 PROBLEM — N18.9 CHRONIC KIDNEY DISEASE, UNSPECIFIED: Chronic | Status: ACTIVE | Noted: 2018-09-26

## 2019-03-29 PROBLEM — J45.40 MODERATE PERSISTENT ASTHMA, UNCOMPLICATED: Chronic | Status: ACTIVE | Noted: 2018-09-26

## 2019-03-29 PROBLEM — E03.9 HYPOTHYROIDISM, UNSPECIFIED: Chronic | Status: ACTIVE | Noted: 2018-09-26

## 2019-03-29 PROBLEM — J84.9 INTERSTITIAL PULMONARY DISEASE, UNSPECIFIED: Chronic | Status: ACTIVE | Noted: 2018-09-26

## 2019-04-08 ENCOUNTER — RESULT REVIEW (OUTPATIENT)
Age: 59
End: 2019-04-08

## 2019-04-09 ENCOUNTER — APPOINTMENT (OUTPATIENT)
Dept: OBGYN | Facility: CLINIC | Age: 59
End: 2019-04-09
Payer: COMMERCIAL

## 2019-04-09 PROCEDURE — 99386 PREV VISIT NEW AGE 40-64: CPT

## 2019-12-04 NOTE — DISCHARGE NOTE ADULT - NSTOBACCOREFERRAL_GEN_A_CS
Spironolactone Pregnancy And Lactation Text: This medication can cause feminization of the male fetus and should be avoided during pregnancy. The active metabolite is also found in breast milk. Topical Sulfur Applications Counseling: Topical Sulfur Counseling: Patient counseled that this medication may cause skin irritation or allergic reactions.  In the event of skin irritation, the patient was advised to reduce the amount of the drug applied or use it less frequently.   The patient verbalized understanding of the proper use and possible adverse effects of topical sulfur application.  All of the patient's questions and concerns were addressed. Doxycycline Pregnancy And Lactation Text: This medication is Pregnancy Category D and not consider safe during pregnancy. It is also excreted in breast milk but is considered safe for shorter treatment courses. Bactrim Pregnancy And Lactation Text: This medication is Pregnancy Category D and is known to cause fetal risk.  It is also excreted in breast milk. Tetracycline Pregnancy And Lactation Text: This medication is Pregnancy Category D and not consider safe during pregnancy. It is also excreted in breast milk. Azithromycin Pregnancy And Lactation Text: This medication is considered safe during pregnancy and is also secreted in breast milk. Isotretinoin Counseling: Patient should get monthly blood tests, not donate blood, not drive at night if vision affected, not share medication, and not undergo elective surgery for 6 months after tx completed. Side effects reviewed, pt to contact office should one occur. Birth Control Pills Counseling: Birth Control Pill Counseling: I discussed with the patient the potential side effects of OCPs including but not limited to increased risk of stroke, heart attack, thrombophlebitis, deep venous thrombosis, hepatic adenomas, breast changes, GI upset, headaches, and depression.  The patient verbalized understanding of the proper use and possible adverse effects of OCPs. All of the patient's questions and concerns were addressed. Doxycycline Counseling:  Patient counseled regarding possible photosensitivity and increased risk for sunburn.  Patient instructed to avoid sunlight, if possible.  When exposed to sunlight, patients should wear protective clothing, sunglasses, and sunscreen.  The patient was instructed to call the office immediately if the following severe adverse effects occur:  hearing changes, easy bruising/bleeding, severe headache, or vision changes.  The patient verbalized understanding of the proper use and possible adverse effects of doxycycline.  All of the patient's questions and concerns were addressed. Dapsone Counseling: I discussed with the patient the risks of dapsone including but not limited to hemolytic anemia, agranulocytosis, rashes, methemoglobinemia, kidney failure, peripheral neuropathy, headaches, GI upset, and liver toxicity.  Patients who start dapsone require monitoring including baseline LFTs and weekly CBCs for the first month, then every month thereafter.  The patient verbalized understanding of the proper use and possible adverse effects of dapsone.  All of the patient's questions and concerns were addressed. Erythromycin Counseling:  I discussed with the patient the risks of erythromycin including but not limited to GI upset, allergic reaction, drug rash, diarrhea, increase in liver enzymes, and yeast infections. Benzoyl Peroxide Pregnancy And Lactation Text: This medication is Pregnancy Category C. It is unknown if benzoyl peroxide is excreted in breast milk. Azithromycin Counseling:  I discussed with the patient the risks of azithromycin including but not limited to GI upset, allergic reaction, drug rash, diarrhea, and yeast infections. Tazorac Pregnancy And Lactation Text: This medication is not safe during pregnancy. It is unknown if this medication is excreted in breast milk. Topical Retinoid counseling:  Patient advised to apply a pea-sized amount only at bedtime and wait 30 minutes after washing their face before applying.  If too drying, patient may add a non-comedogenic moisturizer. The patient verbalized understanding of the proper use and possible adverse effects of retinoids.  All of the patient's questions and concerns were addressed. Erythromycin Pregnancy And Lactation Text: This medication is Pregnancy Category B and is considered safe during pregnancy. It is also excreted in breast milk. Bactrim Counseling:  I discussed with the patient the risks of sulfa antibiotics including but not limited to GI upset, allergic reaction, drug rash, diarrhea, dizziness, photosensitivity, and yeast infections.  Rarely, more serious reactions can occur including but not limited to aplastic anemia, agranulocytosis, methemoglobinemia, blood dyscrasias, liver or kidney failure, lung infiltrates or desquamative/blistering drug rashes. Tazorac Counseling:  Patient advised that medication is irritating and drying.  Patient may need to apply sparingly and wash off after an hour before eventually leaving it on overnight.  The patient verbalized understanding of the proper use and possible adverse effects of tazorac.  All of the patient's questions and concerns were addressed. Spironolactone Counseling: Patient advised regarding risks of diarrhea, abdominal pain, hyperkalemia, birth defects (for female patients), liver toxicity and renal toxicity. The patient may need blood work to monitor liver and kidney function and potassium levels while on therapy. The patient verbalized understanding of the proper use and possible adverse effects of spironolactone.  All of the patient's questions and concerns were addressed. Tetracycline Counseling: Patient counseled regarding possible photosensitivity and increased risk for sunburn.  Patient instructed to avoid sunlight, if possible.  When exposed to sunlight, patients should wear protective clothing, sunglasses, and sunscreen.  The patient was instructed to call the office immediately if the following severe adverse effects occur:  hearing changes, easy bruising/bleeding, severe headache, or vision changes.  The patient verbalized understanding of the proper use and possible adverse effects of tetracycline.  All of the patient's questions and concerns were addressed. Patient understands to avoid pregnancy while on therapy due to potential birth defects. High Dose Vitamin A Counseling: Side effects reviewed, pt to contact office should one occur. Dapsone Pregnancy And Lactation Text: This medication is Pregnancy Category C and is not considered safe during pregnancy or breast feeding. Birth Control Pills Pregnancy And Lactation Text: This medication should be avoided if pregnant and for the first 30 days post-partum. Isotretinoin Pregnancy And Lactation Text: This medication is Pregnancy Category X and is considered extremely dangerous during pregnancy. It is unknown if it is excreted in breast milk. Topical Sulfur Applications Pregnancy And Lactation Text: This medication is Pregnancy Category C and has an unknown safety profile during pregnancy. It is unknown if this topical medication is excreted in breast milk. Include Pregnancy/Lactation Warning?: No Detail Level: Zone Benzoyl Peroxide Counseling: Patient counseled that medicine may cause skin irritation and bleach clothing.  In the event of skin irritation, the patient was advised to reduce the amount of the drug applied or use it less frequently.   The patient verbalized understanding of the proper use and possible adverse effects of benzoyl peroxide.  All of the patient's questions and concerns were addressed. High Dose Vitamin A Pregnancy And Lactation Text: High dose vitamin A therapy is contraindicated during pregnancy and breast feeding. Topical Clindamycin Counseling: Patient counseled that this medication may cause skin irritation or allergic reactions.  In the event of skin irritation, the patient was advised to reduce the amount of the drug applied or use it less frequently.   The patient verbalized understanding of the proper use and possible adverse effects of clindamycin.  All of the patient's questions and concerns were addressed. Topical Clindamycin Pregnancy And Lactation Text: This medication is Pregnancy Category B and is considered safe during pregnancy. It is unknown if it is excreted in breast milk. Topical Retinoid Pregnancy And Lactation Text: This medication is Pregnancy Category C. It is unknown if this medication is excreted in breast milk. Minocycline Counseling: Patient advised regarding possible photosensitivity and discoloration of the teeth, skin, lips, tongue and gums.  Patient instructed to avoid sunlight, if possible.  When exposed to sunlight, patients should wear protective clothing, sunglasses, and sunscreen.  The patient was instructed to call the office immediately if the following severe adverse effects occur:  hearing changes, easy bruising/bleeding, severe headache, or vision changes.  The patient verbalized understanding of the proper use and possible adverse effects of minocycline.  All of the patient's questions and concerns were addressed. Detail Level: Simple Patient declined information

## 2020-11-04 NOTE — H&P ADULT - PROBLEM/PLAN-2
Spoke with patients daughter Zahida regarding ileostomy education and gave her handouts regarding ileostomy care and information. Answered questions that Zahida had and gave her my work number in case she has any further questions. Will continue to follow up with this patient closely.    DISPLAY PLAN FREE TEXT

## 2020-12-01 ENCOUNTER — RESULT REVIEW (OUTPATIENT)
Age: 60
End: 2020-12-01

## 2020-12-02 ENCOUNTER — APPOINTMENT (OUTPATIENT)
Dept: OBGYN | Facility: CLINIC | Age: 60
End: 2020-12-02
Payer: COMMERCIAL

## 2020-12-02 VITALS
BODY MASS INDEX: 37.22 KG/M2 | SYSTOLIC BLOOD PRESSURE: 110 MMHG | DIASTOLIC BLOOD PRESSURE: 70 MMHG | WEIGHT: 218 LBS | HEIGHT: 64 IN

## 2020-12-02 PROCEDURE — 99396 PREV VISIT EST AGE 40-64: CPT

## 2020-12-02 PROCEDURE — 36415 COLL VENOUS BLD VENIPUNCTURE: CPT

## 2020-12-02 PROCEDURE — 99072 ADDL SUPL MATRL&STAF TM PHE: CPT

## 2021-10-11 DIAGNOSIS — Z78.0 ASYMPTOMATIC MENOPAUSAL STATE: ICD-10-CM

## 2021-10-11 DIAGNOSIS — Z80.3 FAMILY HISTORY OF MALIGNANT NEOPLASM OF BREAST: ICD-10-CM

## 2021-10-11 DIAGNOSIS — Z00.00 ENCOUNTER FOR GENERAL ADULT MEDICAL EXAMINATION W/OUT ABNORMAL FINDINGS: ICD-10-CM

## 2021-10-11 DIAGNOSIS — Z87.09 PERSONAL HISTORY OF OTHER DISEASES OF THE RESPIRATORY SYSTEM: ICD-10-CM

## 2021-10-11 DIAGNOSIS — J45.909 UNSPECIFIED ASTHMA, UNCOMPLICATED: ICD-10-CM

## 2021-10-11 DIAGNOSIS — Z82.49 FAMILY HISTORY OF ISCHEMIC HEART DISEASE AND OTHER DISEASES OF THE CIRCULATORY SYSTEM: ICD-10-CM

## 2021-10-11 DIAGNOSIS — E03.9 HYPOTHYROIDISM, UNSPECIFIED: ICD-10-CM

## 2021-10-11 DIAGNOSIS — Z87.19 PERSONAL HISTORY OF OTHER DISEASES OF THE DIGESTIVE SYSTEM: ICD-10-CM

## 2021-10-11 DIAGNOSIS — I10 ESSENTIAL (PRIMARY) HYPERTENSION: ICD-10-CM

## 2021-10-11 DIAGNOSIS — Z78.9 OTHER SPECIFIED HEALTH STATUS: ICD-10-CM

## 2021-10-11 DIAGNOSIS — Z80.41 FAMILY HISTORY OF MALIGNANT NEOPLASM OF OVARY: ICD-10-CM

## 2021-10-11 DIAGNOSIS — Z86.59 PERSONAL HISTORY OF OTHER MENTAL AND BEHAVIORAL DISORDERS: ICD-10-CM

## 2021-10-11 DIAGNOSIS — Z80.0 FAMILY HISTORY OF MALIGNANT NEOPLASM OF DIGESTIVE ORGANS: ICD-10-CM

## 2021-10-11 RX ORDER — BUDESONIDE AND FORMOTEROL FUMARATE DIHYDRATE 160; 4.5 UG/1; UG/1
AEROSOL RESPIRATORY (INHALATION)
Refills: 0 | Status: ACTIVE | COMMUNITY

## 2021-10-11 RX ORDER — VERAPAMIL HYDROCHLORIDE 80 MG/1
TABLET ORAL
Refills: 0 | Status: ACTIVE | COMMUNITY

## 2021-10-11 RX ORDER — LEVOTHYROXINE SODIUM 137 UG/1
TABLET ORAL
Refills: 0 | Status: ACTIVE | COMMUNITY

## 2021-10-12 PROBLEM — Z00.00 ENCOUNTER FOR PREVENTIVE HEALTH EXAMINATION: Status: ACTIVE | Noted: 2017-10-26

## 2021-10-12 LAB — CYTOLOGY CVX/VAG DOC THIN PREP: NORMAL

## 2021-11-15 DIAGNOSIS — B00.9 HERPESVIRAL INFECTION, UNSPECIFIED: ICD-10-CM

## 2021-11-29 ENCOUNTER — RX RENEWAL (OUTPATIENT)
Age: 61
End: 2021-11-29

## 2021-12-10 ENCOUNTER — APPOINTMENT (OUTPATIENT)
Dept: OBGYN | Facility: CLINIC | Age: 61
End: 2021-12-10

## 2021-12-20 ENCOUNTER — NON-APPOINTMENT (OUTPATIENT)
Age: 61
End: 2021-12-20

## 2021-12-20 ENCOUNTER — APPOINTMENT (OUTPATIENT)
Dept: OBGYN | Facility: CLINIC | Age: 61
End: 2021-12-20

## 2021-12-20 DIAGNOSIS — Z78.9 OTHER SPECIFIED HEALTH STATUS: ICD-10-CM

## 2023-05-15 ENCOUNTER — APPOINTMENT (OUTPATIENT)
Dept: OBGYN | Facility: CLINIC | Age: 63
End: 2023-05-15

## 2023-08-21 ENCOUNTER — APPOINTMENT (OUTPATIENT)
Dept: OBGYN | Facility: CLINIC | Age: 63
End: 2023-08-21
Payer: COMMERCIAL

## 2023-08-21 VITALS
HEART RATE: 81 BPM | BODY MASS INDEX: 29.53 KG/M2 | WEIGHT: 173 LBS | DIASTOLIC BLOOD PRESSURE: 81 MMHG | SYSTOLIC BLOOD PRESSURE: 116 MMHG | HEIGHT: 64 IN | OXYGEN SATURATION: 98 %

## 2023-08-21 DIAGNOSIS — Z01.419 ENCOUNTER FOR GYNECOLOGICAL EXAMINATION (GENERAL) (ROUTINE) W/OUT ABNORMAL FINDINGS: ICD-10-CM

## 2023-08-21 DIAGNOSIS — R92.2 INCONCLUSIVE MAMMOGRAM: ICD-10-CM

## 2023-08-21 DIAGNOSIS — Z86.79 PERSONAL HISTORY OF OTHER DISEASES OF THE CIRCULATORY SYSTEM: ICD-10-CM

## 2023-08-21 DIAGNOSIS — Z80.41 FAMILY HISTORY OF MALIGNANT NEOPLASM OF OVARY: ICD-10-CM

## 2023-08-21 DIAGNOSIS — Z11.51 ENCOUNTER FOR SCREENING FOR HUMAN PAPILLOMAVIRUS (HPV): ICD-10-CM

## 2023-08-21 DIAGNOSIS — Z11.4 ENCOUNTER FOR SCREENING FOR HUMAN IMMUNODEFICIENCY VIRUS [HIV]: ICD-10-CM

## 2023-08-21 DIAGNOSIS — Z12.31 ENCOUNTER FOR SCREENING MAMMOGRAM FOR MALIGNANT NEOPLASM OF BREAST: ICD-10-CM

## 2023-08-21 DIAGNOSIS — Z87.09 PERSONAL HISTORY OF OTHER DISEASES OF THE RESPIRATORY SYSTEM: ICD-10-CM

## 2023-08-21 DIAGNOSIS — Z86.39 PERSONAL HISTORY OF OTHER ENDOCRINE, NUTRITIONAL AND METABOLIC DISEASE: ICD-10-CM

## 2023-08-21 DIAGNOSIS — Z11.3 ENCOUNTER FOR SCREENING FOR INFECTIONS WITH A PREDOMINANTLY SEXUAL MODE OF TRANSMISSION: ICD-10-CM

## 2023-08-21 PROCEDURE — 36415 COLL VENOUS BLD VENIPUNCTURE: CPT

## 2023-08-21 PROCEDURE — 99396 PREV VISIT EST AGE 40-64: CPT

## 2023-08-21 NOTE — END OF VISIT
[FreeTextEntry3] : I, Rox Sinclair, acted as a scribe on behalf of Dr. Lisa Quintero on 08/21/2023 .  All medical entries made by the scribe were at my, Dr. Lisa Quintero, direction and personally dictated by me on 08/21/2023. I have reviewed the chart and agree that the record accurately reflects my personal performance of the history, physical exam, assessment and plan. I have also personally directed, reviewed, and agreed with the chart.

## 2023-08-21 NOTE — HISTORY OF PRESENT ILLNESS
[FreeTextEntry1] : 61 y/o  presents for annual exam. Last seen 2020 with negative pap/hpv. Last mammogram 2021. Pt is doing well and had no complaints. She reports she has not been sexually active but is considering  it, so she requests STI screen.  she was advised genetic testing due to FHx cancers at prior visit.  Pt lives in [facility] and works as a .   [Mammogramdate] : 9/2021

## 2023-08-21 NOTE — PLAN
[FreeTextEntry1] : 63 y/o  for annual exam.  - pap done today - STI screening done today - I discussed patient's and family history in detail and counseled her on testing that analyzes genes associated with specific hereditary cancer risks. I explained to the patient that results may be positive, negative or showing a variant of uncertain significance, as well as the possible significance of these results. Patient understands she will need to come to office for follow up visit if results are positive for genes associated with increased cancer risk. We also discussed the benefits, risks and limitations of the genetic panel testing. Patient understands that knowing her risk of certain cancers based on genetic information will allow for management changes that can prevent or reduce risk of cancer. Such management changes will be fully discussed when results are available. Questions were answered, consents signed. Blood sent to lab. - referred for mammo/sono - Pt utd with colonoscopy

## 2023-08-22 LAB
C TRACH RRNA SPEC QL NAA+PROBE: NOT DETECTED
HBV SURFACE AG SER QL: NONREACTIVE
HCV AB SER QL: NONREACTIVE
HCV S/CO RATIO: 0.05 S/CO
HIV1+2 AB SPEC QL IA.RAPID: NONREACTIVE
HPV HIGH+LOW RISK DNA PNL CVX: NOT DETECTED
N GONORRHOEA RRNA SPEC QL NAA+PROBE: NOT DETECTED
SOURCE TP AMPLIFICATION: NORMAL
T PALLIDUM AB SER QL IA: NEGATIVE

## 2023-08-24 LAB — CYTOLOGY CVX/VAG DOC THIN PREP: ABNORMAL

## 2023-09-21 ENCOUNTER — APPOINTMENT (OUTPATIENT)
Dept: OBGYN | Facility: CLINIC | Age: 63
End: 2023-09-21

## 2023-10-23 NOTE — PROGRESS NOTE ADULT - SUBJECTIVE AND OBJECTIVE BOX
Jocelyn Page is a 81year old male presenting to the walk-in clinic today alone for chest palpitations,  that come and go since Saturday, last episode was last night. Patient reports checking his BP at home yesterday and was about 150s/90s. He reports he is also experiencing fatigue since Saturday. Treatment tried prior to visit: NONE    Swabs/Specimens collected during rooming process:  None     Work, School or  note needed: No    New vs Established: New    Patient would like communication of their results via:        Cell Phone:   Telephone Information:   Mobile [phone] to leave a message containing results?  Yes HEALTH ISSUES - PROBLEM Dx:  Headache: Headache  Need for prophylactic measure: Need for prophylactic measure  Migraine: Migraine  NIKOLE (acute kidney injury): NIKOLE (acute kidney injury)  Hypothyroidism: Hypothyroidism  HTN (hypertension): HTN (hypertension)  Interstitial lung disease: Interstitial lung disease  Electrolyte abnormality: Electrolyte abnormality  Syncope and collapse: Syncope and collapse      MEDICATIONS  (STANDING):  atovaquone Suspension 750 milliGRAM(s) Oral daily  diphenhydrAMINE   Injectable 25 milliGRAM(s) IV Push every 6 hours  heparin  Injectable 5000 Unit(s) SubCutaneous every 8 hours  labetalol 100 milliGRAM(s) Oral two times a day  levothyroxine 100 MICROGram(s) Oral daily  metoclopramide Injectable 10 milliGRAM(s) IV Push every 6 hours  polyethylene glycol 3350 17 Gram(s) Oral daily  predniSONE   Tablet 20 milliGRAM(s) Oral every 12 hours  valproate sodium IVPB 500 milliGRAM(s) IV Intermittent every 6 hours    MEDICATIONS  (PRN):  acetaminophen 300 mG/butalbital 50 mG/ caffeine 40 mG 1 Capsule(s) Oral every 8 hours PRN Moderate Pain (4 - 6)  Vital Signs Last 24 Hrs  T(C): 36.9 (29 Sep 2018 07:38), Max: 36.9 (29 Sep 2018 07:38)  T(F): 98.4 (29 Sep 2018 07:38), Max: 98.4 (29 Sep 2018 07:38)  HR: 70 (29 Sep 2018 07:38) (68 - 86)  BP: 123/76 (29 Sep 2018 07:38) (104/57 - 123/79)  BP(mean): --  RR: 18 (29 Sep 2018 07:38) (17 - 18)  SpO2: 98% (29 Sep 2018 07:38) (97% - 99%)    < from: CT Head No Cont (09.26.18 @ 14:32) >  EXAM:  CT BRAIN                            PROCEDURE DATE:  09/26/2018          INTERPRETATION:  CT HEAD WITHOUT CONTRAST    INDICATION: 57 years old. Female. fall.     COMPARISON: None available.    TECHNIQUE: Noncontrast axial CT head was obtained from the skull base to   vertex.    FINDINGS:  No acute intracranial hemorrhage, mass effect or midline shift.  No CT evidence of acute large territory vascular infarct.  The ventricles and cortical sulci are within normal limits.    Small mucus retention cyst/polyp in the left maxillary sinus. The mastoid   air cells are well aerated.  No displaced calvarial fracture.    IMPRESSION:   No acute intracranial hemorrhage or mass effect.      SUBJECTIVE: Headache and other symptoms almost resolved (2/10).   OBJECTIVE: Alert, awake, oriented in time place and person with normal memory and language. SETH, VF full, fundus discs and retina normal, EOM full without nystagmus, facial sensations, jaw strength, facial movements, hearing, palate, neck, shoulder and tongue are normal and symmetrical. Extremities sensation for pin vibration touch and position are diminished in the ankles and toes. Tone strength, reflexes and plantar responses are normal and symmetrical. Finger-to-nose and heel-to-shin do not show dysmetria. Rapid Alternating Movements are normal.   ANALYSIS: Migraine with aura with intractable migraine responsive to IV valproic acid and metoclopramide.  PLAN: Resume Topiramate 100 mg a day (Trokendi (long acting proprietary form of topiramate that she had been using), add verapamil SR/ mg every night to preventt sharp nocturnal headaches; seek orthodontist treatment for moderate sleep apnea. Follow up with regular neurologist

## 2023-12-31 PROBLEM — Z11.3 ENCOUNTER FOR SCREENING EXAMINATION FOR SEXUALLY TRANSMITTED DISEASE: Status: RESOLVED | Noted: 2023-08-21 | Resolved: 2023-09-04

## 2024-01-02 NOTE — H&P ADULT - PROBLEM SELECTOR PROBLEM 3
What Type Of Note Output Would You Prefer (Optional)?: Bullet Format
Is This A New Presentation, Or A Follow-Up?: Rash
Interstitial lung disease

## 2024-01-31 RX ORDER — ACYCLOVIR 50 MG/G
5 CREAM TOPICAL 3 TIMES DAILY
Qty: 1 | Refills: 3 | Status: ACTIVE | COMMUNITY
Start: 2021-11-15 | End: 1900-01-01

## 2024-01-31 RX ORDER — VALACYCLOVIR 500 MG/1
500 TABLET, FILM COATED ORAL
Qty: 90 | Refills: 2 | Status: ACTIVE | COMMUNITY
Start: 2021-11-15 | End: 1900-01-01

## 2024-11-29 ENCOUNTER — LABORATORY RESULT (OUTPATIENT)
Age: 64
End: 2024-11-29

## 2024-11-29 ENCOUNTER — APPOINTMENT (OUTPATIENT)
Dept: OBGYN | Facility: CLINIC | Age: 64
End: 2024-11-29
Payer: COMMERCIAL

## 2024-11-29 VITALS
DIASTOLIC BLOOD PRESSURE: 82 MMHG | SYSTOLIC BLOOD PRESSURE: 124 MMHG | WEIGHT: 187 LBS | BODY MASS INDEX: 31.92 KG/M2 | HEIGHT: 64 IN

## 2024-11-29 DIAGNOSIS — Z13.820 ENCOUNTER FOR SCREENING FOR OSTEOPOROSIS: ICD-10-CM

## 2024-11-29 DIAGNOSIS — Z12.39 ENCOUNTER FOR OTHER SCREENING FOR MALIGNANT NEOPLASM OF BREAST: ICD-10-CM

## 2024-11-29 PROCEDURE — 99396 PREV VISIT EST AGE 40-64: CPT

## 2024-12-02 LAB — HPV HIGH+LOW RISK DNA PNL CVX: DETECTED

## 2024-12-03 LAB — CYTOLOGY CVX/VAG DOC THIN PREP: ABNORMAL

## 2025-04-09 NOTE — PATIENT PROFILE ADULT. - COMFORT LEVEL, ACCEPTABLE
For concerns or needing assistance call the [facility] at [phone]  Continue Wellbutrin 150mgXL every morning  Continue lorazepam 0.5mg tab-take 1 tablet twice daily as needed for anxiety  Continue with prozac 80mg daily  
0

## 2025-04-16 DIAGNOSIS — Z80.3 FAMILY HISTORY OF MALIGNANT NEOPLASM OF BREAST: ICD-10-CM

## 2025-04-16 DIAGNOSIS — Z12.31 ENCOUNTER FOR SCREENING MAMMOGRAM FOR MALIGNANT NEOPLASM OF BREAST: ICD-10-CM
